# Patient Record
Sex: MALE | Race: WHITE | NOT HISPANIC OR LATINO | Employment: OTHER | URBAN - METROPOLITAN AREA
[De-identification: names, ages, dates, MRNs, and addresses within clinical notes are randomized per-mention and may not be internally consistent; named-entity substitution may affect disease eponyms.]

---

## 2021-05-13 ENCOUNTER — OFFICE VISIT - HEALTHEAST (OUTPATIENT)
Dept: FAMILY MEDICINE | Facility: CLINIC | Age: 46
End: 2021-05-13

## 2021-05-13 DIAGNOSIS — E66.01 MORBID OBESITY (H): ICD-10-CM

## 2021-05-13 DIAGNOSIS — R06.83 SNORING: ICD-10-CM

## 2021-05-13 DIAGNOSIS — R03.0 ELEVATED BLOOD PRESSURE READING WITHOUT DIAGNOSIS OF HYPERTENSION: ICD-10-CM

## 2021-05-13 DIAGNOSIS — Z00.00 ANNUAL PHYSICAL EXAM: ICD-10-CM

## 2021-05-13 DIAGNOSIS — G25.81 RESTLESS LEG SYNDROME: ICD-10-CM

## 2021-05-13 LAB
ALBUMIN SERPL-MCNC: 4 G/DL (ref 3.5–5)
ALP SERPL-CCNC: 80 U/L (ref 45–120)
ALT SERPL W P-5'-P-CCNC: 33 U/L (ref 0–45)
ANION GAP SERPL CALCULATED.3IONS-SCNC: 13 MMOL/L (ref 5–18)
AST SERPL W P-5'-P-CCNC: 19 U/L (ref 0–40)
BILIRUB SERPL-MCNC: 0.3 MG/DL (ref 0–1)
BUN SERPL-MCNC: 13 MG/DL (ref 8–22)
CALCIUM SERPL-MCNC: 9.3 MG/DL (ref 8.5–10.5)
CHLORIDE BLD-SCNC: 105 MMOL/L (ref 98–107)
CHOLEST SERPL-MCNC: 195 MG/DL
CO2 SERPL-SCNC: 24 MMOL/L (ref 22–31)
CREAT SERPL-MCNC: 0.95 MG/DL (ref 0.7–1.3)
ERYTHROCYTE [DISTWIDTH] IN BLOOD BY AUTOMATED COUNT: 14.3 % (ref 11–14.5)
FASTING STATUS PATIENT QL REPORTED: YES
FERRITIN SERPL-MCNC: 58 NG/ML (ref 27–300)
GFR SERPL CREATININE-BSD FRML MDRD: >60 ML/MIN/1.73M2
GLUCOSE BLD-MCNC: 98 MG/DL (ref 70–125)
HBA1C MFR BLD: 5.8 %
HCT VFR BLD AUTO: 47.9 % (ref 40–54)
HDLC SERPL-MCNC: 34 MG/DL
HGB BLD-MCNC: 15.3 G/DL (ref 14–18)
LDLC SERPL CALC-MCNC: 121 MG/DL
MAGNESIUM SERPL-MCNC: 2.2 MG/DL (ref 1.8–2.6)
MCH RBC QN AUTO: 27.7 PG (ref 27–34)
MCHC RBC AUTO-ENTMCNC: 31.9 G/DL (ref 32–36)
MCV RBC AUTO: 87 FL (ref 80–100)
PLATELET # BLD AUTO: 338 THOU/UL (ref 140–440)
PMV BLD AUTO: 10.2 FL (ref 7–10)
POTASSIUM BLD-SCNC: 4.6 MMOL/L (ref 3.5–5)
PROT SERPL-MCNC: 7.4 G/DL (ref 6–8)
RBC # BLD AUTO: 5.52 MILL/UL (ref 4.4–6.2)
SODIUM SERPL-SCNC: 142 MMOL/L (ref 136–145)
TRIGL SERPL-MCNC: 198 MG/DL
TSH SERPL DL<=0.005 MIU/L-ACNC: 2.19 UIU/ML (ref 0.3–5)
WBC: 10.9 THOU/UL (ref 4–11)

## 2021-05-13 ASSESSMENT — PATIENT HEALTH QUESTIONNAIRE - PHQ9: SUM OF ALL RESPONSES TO PHQ QUESTIONS 1-9: 10

## 2021-05-13 ASSESSMENT — MIFFLIN-ST. JEOR: SCORE: 2453.67

## 2021-05-14 LAB — 25(OH)D3 SERPL-MCNC: 20.3 NG/ML (ref 30–80)

## 2021-05-15 ENCOUNTER — COMMUNICATION - HEALTHEAST (OUTPATIENT)
Dept: FAMILY MEDICINE | Facility: CLINIC | Age: 46
End: 2021-05-15

## 2021-05-27 VITALS
HEART RATE: 85 BPM | WEIGHT: 315 LBS | BODY MASS INDEX: 39.17 KG/M2 | TEMPERATURE: 97.6 F | RESPIRATION RATE: 16 BRPM | HEIGHT: 75 IN | SYSTOLIC BLOOD PRESSURE: 133 MMHG | DIASTOLIC BLOOD PRESSURE: 99 MMHG

## 2021-05-27 ASSESSMENT — PATIENT HEALTH QUESTIONNAIRE - PHQ9: SUM OF ALL RESPONSES TO PHQ QUESTIONS 1-9: 10

## 2021-05-30 ENCOUNTER — RECORDS - HEALTHEAST (OUTPATIENT)
Dept: ADMINISTRATIVE | Facility: CLINIC | Age: 46
End: 2021-05-30

## 2021-06-16 PROBLEM — M25.569 PATELLAR PAIN: Status: ACTIVE | Noted: 2021-05-13

## 2021-06-16 PROBLEM — E66.01 MORBID OBESITY (H): Status: ACTIVE | Noted: 2021-05-13

## 2021-06-16 PROBLEM — E55.9 VITAMIN D DEFICIENCY: Status: ACTIVE | Noted: 2021-05-15

## 2021-06-16 PROBLEM — E78.5 DYSLIPIDEMIA: Status: ACTIVE | Noted: 2021-05-15

## 2021-06-16 PROBLEM — R73.01 IMPAIRED FASTING GLUCOSE: Status: ACTIVE | Noted: 2021-05-15

## 2021-06-17 NOTE — PATIENT INSTRUCTIONS - HE
Check home records for previous tetanus booster. Otherwise due September 2021. If you want, I can place an order for nurse only visit.     If blood pressure elevated on recheck, we will see you back for a nurse only visit in a few weeks to recheck (or check at the bariatric clinic). I anticipate this will improve with management of weight and sleep apnea, but goal is under 140/90.     Sleep Clinic: [phone]    Bariatric Clinic: [phone]    If you are signed up for [employer], we will release your labs online with comments. Otherwise, we will plan to send letter in mail. We will call if anything is significantly abnormal.

## 2021-06-17 NOTE — PROGRESS NOTES
Name: Alex Land  : 1975   MRN: [id number]    ASSESSMENT & PLAN:   Alex Land is a 45 y.o. male presenting today for annual physical exam and to establish care.     1. Annual physical exam  Reviewed health history and health maintenance recommendations.  Will be due for tetanus booster in the fall, offered nurse only visit for tetanus booster.  He is going to check with previous clinic as he [place]  Encouraged healthy lifestyle modifications for weight management.  Reviewed PHQ-9 score, is mildly elevated, denies concerns for depression.  Is working on addressing factors (weight, sleep apnea) that are increasing his scores.    Regarding cystic lesion lateral left hip, suspect this is an epidermoid or sebaceous cyst. If enlarging, inflamed, or painful, we can address. As it seems to be shrinking, recommend monitoring for now.    2. Morbid obesity (H)  - Lipid [place] FASTING  - Comprehensive Metabolic Panel  - Vitamin D, Total (25-Hydroxy)  - Ambulatory referral to Sleep Medicine  - Ambulatory referral to Bariatric Care: Surgical and Non-Surgical  - HM2(CBC w/o Differential)  - Thyroid Cascade  - Glycosylated Hemoglobin A1c    Alex successfully lost a significant amount of weight in the past 3 lifestyle modifications.  Confident he can do it again.  Given his BMI greater than 40, do recommend that he meet with the bariatric clinic for guidance, con ability, working with a dietitian, and consideration for medication options.  Reviewed with BMI greater than 40, he technically is also candidate for surgical weight loss.  He will proceed with nonsurgical options for now.    3. Snoring  - Ambulatory referral to Sleep Medicine    Positive stop bang questionnaire.  Risk factors for sleep apnea.  Proceed with sleep medicine referral for sleep study.    4. Elevated blood pressure reading without diagnosis of hypertension  Blood pressure is borderline  elevated today.  No prior readings for comparison.  Recommend checking at the bariatric weight loss clinic.  Suspect he will lose weight with management of sleep apnea and weight loss.  If he is not able to get it checked at the bariatric weight loss clinic in the next few weeks, we can schedule nurse only visit to recheck blood pressure as well.  If blood pressure persistently elevated, will initiate blood pressure medications for management.    5. Restless leg syndrome  - Magnesium  - Ferritin     Rule out electrolyte deficiencies.  Screen for iron deficiency.  We will discuss treatment options pending lab results.    Return for 2 weeks - recheck BP nurse only visit if elevated / 1 year - annual physical exam / as needed.    Tamara Adams, [facility]          Alex Land is a 45 y.o. male presenting to discuss the following:     CC:   Chief Complaint   Patient presents with     [facility]     Annual Exam     Discuss weight loss referral, sleep study referral-concerns of sleep apnea, fasting labs       HPI:  ADHD diagnosed , used stimulants for 2 years [facility], didn't renew after then. Feels he is adequately coping. Initially prescribed by Dr. Chaudhari, Adderall, doesn't he needs right now.     Weight gain: 2010 weighed 280 down to 210. Slow weight gain since then, last 4 years 250 -> 280 --> up to current weight. Previously focused on calorie counting and exercise. Needs to change his diet, stress eating, lacks motivation for physical activity. COVID not helping but previous issue.    Sleep concerns: previous sleep apnea study in  or , was having events but not enough for diagnosis, was 220-230 lb back then. Last 2 years, wakes up in middle of the night, going to the bathroom 3-5 times. Trialed CPAP machine and sleeping through the night.   STOPBAN+ score    PHQ9: 10/27, mood overall is good.     Changing mole: growth or skin tag, a year ago looked  like a pimple, then size of a dime, now starting to shrink, tender to touch.     Changes in vision - prescription is old, 4 years old, noticing in the last year street signs aren't as clear.     Left neck/shoulder pain: can't recreate the pain, randomly occurs every few days to weeks. Feels like crink in neck or when inhaling something feels constricted. Disappears for several days after that.     Does not dyspnea with exertion, gradual onset associated with weight gain.     Restless leg symptoms: always jumpy, actively noticed this year, sitting in recliner and has to move legs every 15 minutes or so. Occasional involuntary twinge/kick.     ROS:   See HPI above. Remaining 10 point ROS negative.     PMH:   Patient Active Problem List   Diagnosis     ADHD, Predominantly Inattentive Type     Hx malalignment of kneecaps bilaterally     Morbid obesity (H)       Past Medical History:   Diagnosis Date     Scarlet fever     diagnosed with heart murmur       PSH:   History reviewed. No pertinent surgical history.      MEDICATIONS:   Current Outpatient Medications on File Prior to Visit   Medication Sig Dispense Refill     ascorbic acid (VITAMIN C ORAL) Take 1 tablet by mouth daily.       aspirin 81 MG EC tablet Take 81 mg by mouth daily.       cholecalciferol, vitamin D3, (VITAMIN D3 ORAL) Take 1 tablet by mouth daily.       ZINC ORAL Take 1 tablet by mouth daily.       No current facility-administered medications on file prior to visit.        ALLERGIES:  No Known Allergies    FAMHx:  Family History   Problem Relation Age of Onset     Hypertension Maternal Grandfather      Asthma Maternal Grandfather      Osteoporosis Maternal Grandfather        SOCIAL HISTORY:   Social History     Tobacco Use     Smoking status: Former Smoker     Years: 1.50     Smokeless tobacco: Never Used   Substance Use Topics     Alcohol use: Yes     Frequency: 2-4 times a month     Drinks per session: 1 or 2     Drug use: Never       PHYSICAL EXAM:  "  BP (!) 145/92 (Patient Site: Left Arm, Patient Position: Sitting, Cuff Size: Adult Large)   Pulse 85   Temp 97.6  F (36.4  C) (Oral)   Resp 16   Ht 6' 2.5\" (1.892 m)   Wt (!) 327 lb 9.6 oz (148.6 kg)   BMI 41.50 kg/m     GENERAL: Alex is a pleasant, morbidly obese male, no acute distress.  HEENT: Sclera white, no cervical lymphadenopathy, no thyromegaly.  HEART: Regular rate and rhythm, no murmurs.  LUNGS: Clear to auscultation bilaterally, unlabored.  ABDOMEN: Abdomen obese, soft, nontender to palpation.  : No concerns today, exam deferred.  MSK: No gross deformities or effusions.  NEURO: No gross deficits present.  Speech intact, normal gait, moving all extremities without difficulties.  PSYCH: Mood is good, normal affect, appropriately groomed, normal thought content.  DERM: Lateral left hip, erythematous nontender, nonwarm cystic lesion.           "

## 2021-06-17 NOTE — PROGRESS NOTES
Patient sent letter in mail with lab results.   A1c mildly elevated, monitor annually for progression to diabetes.  Dyslipidemia present, encourage lifestyle modifications and weight management.   Vitamin D low, recommend OTC supplementation.  Remaining labs in normal range.  Tamara Adams, DO

## 2021-06-21 NOTE — LETTER
Letter by Tamara Adams DO at      Author: Tamara Adams DO Service: -- Author Type: --    Filed:  Encounter Date: 5/15/2021 Status: (Other)         Alex Land  [address] [facility] [address]             May 15, 2021         Dear Mr. Land,    Thank you for coming into the clinic. It was very nice to meet you. Below are the results from your recent visit:    Resulted Orders   Lipid [place] FASTING   Result Value Ref Range    Cholesterol 195 <=199 mg/dL    Triglycerides 198 (H) <=149 mg/dL    HDL Cholesterol 34 (L) >=40 mg/dL    LDL Calculated 121 <=129 mg/dL    Patient Fasting > 8hrs? Yes    Comprehensive Metabolic Panel   Result Value Ref Range    Sodium 142 136 - 145 mmol/L    Potassium 4.6 3.5 - 5.0 mmol/L    Chloride 105 98 - 107 mmol/L    CO2 24 22 - 31 mmol/L    Anion Gap, Calculation 13 5 - 18 mmol/L    Glucose 98 70 - 125 mg/dL    BUN 13 8 - 22 mg/dL    Creatinine 0.95 0.70 - 1.30 mg/dL    GFR MDRD Af Amer >60 >60 mL/min/1.73m2    GFR MDRD Non Af Amer >60 >60 mL/min/1.73m2    Bilirubin, Total 0.3 0.0 - 1.0 mg/dL    Calcium 9.3 8.5 - 10.5 mg/dL    Protein, Total 7.4 6.0 - 8.0 g/dL    Albumin 4.0 3.5 - 5.0 g/dL    Alkaline Phosphatase 80 45 - 120 U/L    AST 19 0 - 40 U/L    ALT 33 0 - 45 U/L    Narrative    Fasting Glucose reference range is 70-99 mg/dL per  American Diabetes Association (ADA) guidelines.   Vitamin D, Total (25-Hydroxy)   Result Value Ref Range    Vitamin D, Total (25-Hydroxy) 20.3 (L) 30.0 - 80.0 ng/mL    Narrative    Deficiency <10.0 ng/mL  Insufficiency 10.0-29.9 ng/mL  Sufficiency 30.0-80.0 ng/mL  Toxicity (possible) >100.0 ng/mL   HM2(CBC w/o Differential)   Result Value Ref Range    WBC 10.9 4.0 - 11.0 thou/uL    RBC 5.52 4.40 - 6.20 mill/uL    Hemoglobin 15.3 14.0 - 18.0 g/dL    Hematocrit 47.9 40.0 - 54.0 %    MCV 87 80 - 100 fL    MCH 27.7 27.0 - 34.0 pg    MCHC 31.9 (L) 32.0 - 36.0 g/dL    RDW 14.3 11.0 - 14.5 %    Platelets 338 140 - 440 thou/uL    MPV 10.2 (H) 7.0  - 10.0 fL   Thyroid Cascade   Result Value Ref Range    TSH 2.19 0.30 - 5.00 uIU/mL   Glycosylated Hemoglobin A1c   Result Value Ref Range    Hemoglobin A1c 5.8 (H) <=5.6 %      Comment:      Prediabetes:   HBA1c       5.7 to 6.4%        Diabetes:        HBA1c        >=6.5%   Patients with Hgb F >5%, total bilirubin >10.0 mg/dL, abnormal red cell turnover, severe renal or hepatic disease or malignancy should not have this A1C method used to diagnose or monitor diabetes.       Magnesium   Result Value Ref Range    Magnesium 2.2 1.8 - 2.6 mg/dL   Ferritin   Result Value Ref Range    Ferritin 58 27 - 300 ng/mL     Overall, your labs look pretty good. Your A1c is just in the pre-diabetes range, which is something we should monitor annually but should improve with weight management. Your vitamin D level is a little low. I recommend supplementation with OTC vitamin D3 1000 international unit(s) daily. Your cholesterol labs are a touch abnormal, but not to a level requiring treatment, and again should respond to weight management and improved nutrition. No electrolyte abnormalities to explain restless leg symptoms. If desired, we can discuss treatment options further to manage those symptoms.    Please call with questions or contact us using [employer].    Sincerely,        Electronically signed by Tamara Adams,

## 2021-07-14 NOTE — PROGRESS NOTES
"Alex Land is a 45 year old male who is being evaluated via a billable video visit.       The patient has been notified of following:      \"This video visit will be conducted via a call between you and your physician/provider. We have found that certain health care needs can be provided without the need for an in-person physical exam.  This service lets us provide the care you need with a video conversation.  If a prescription is necessary we can send it directly to your pharmacy.  If lab work is needed we can place an order for that and you can then stop by our lab to have the test done at a later time.     Video visits are billed at different rates depending on your insurance coverage.  Please reach out to your insurance provider with any questions.     If during the course of the call the physician/provider feels a video visit is not appropriate, you will not be charged for this service.\"     Patient has given verbal consent for Video visit? Yes  How would you like to obtain your AVS? Mail a copy  If you are dropped from the video visit, the video invite should be resent to: Text to cell phone: [phone]  Will anyone else be joining your video visit? No  If patient encounters technical issues they should call [phone]      Video-Visit Details     Type of service:  Video Visit     Video Start Time: 10am  Video End Time: 10:36 AM    Originating Location (pt. Location): Home     Distant Location (provider location):  [facility]      Platform used for Video Visit: AmWell    Virtual visit for high pretest probability for sleep disordered breathing.     Assessment:  -High pretest probability of sleep disordered breathing with a STOP-BANG score of at least 5 with unknown neck circumference  -Mild and intermittent restless leg symptoms  -Low normal ferritin at 58 ng/mL    Plan:  -He appears to be appropriate for home sleep testing given a lack of other significant cardiac or " pulmonary or neurologic comorbidities, orders placed for watch pat home sleep test male device today.  -Given his mild and intermittent restless leg symptoms, he did not appear to be in need of a prescription medication, we did discuss start of oral iron replacement with a goal of at least 65 mg of elemental iron daily.  Specifically we discussed ferrous sulfate 325 mg orally with a form of vitamin C and to avoid calcium within 1 hour before after.    SUBJECTIVE:  Alex Land is a 45 year old year old male.    Pertinent PMHx of morbid obesity, ADHD, elevated BP readings.    Concerns / risk factors for LAUREN with STOPBANG 5+ noted at visit with Dr. Adams on 5/13/2021, also discussion of RLS with plan to check baseline magnesium and ferritin.  BMI 41.5.  CBC, TSH, LFT's WNL.  CMP (HgbA1c 5.8, HDL 34, Triglycerides 198) and vitamin D low at 20.3.  Ferritin low normal at 58.    Today -his concerns are a 3-4-year history of increasing nighttime awakenings and a 10+ year history of daytime fatigue.  He also has been told by friends and family that he can snore very loudly and appear to have prolonged breathing pauses in his sleep.    He recalls having what sounds like an in lab PSG in approximately 2006.  I did not have results to review from this, he does not recall being told that he had sleep apnea and he is unsure how his weight compared then until now.    While he does endorse daytime fatigue, he denies inappropriate daytime sleepiness and no concerns with driving.    No known family history of sleep disordered breathing.    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  He is using this as a sabbatical with a goal to focus on his health.         Past medical history:    Patient Active Problem List    Diagnosis Date Noted     Impaired fasting glucose 05/15/2021     Priority: Medium     Dyslipidemia 05/15/2021     Priority: Medium     Vitamin D  deficiency 05/15/2021     Priority: Medium     Hx malalignment of kneecaps bilaterally 05/13/2021     Priority: Medium     Morbid obesity (H) 05/13/2021     Priority: Medium     ADHD, Predominantly Inattentive Type      Priority: Medium     Created by Conversion  Replacement Utility updated for latest IMO load           10 point ROS of systems including Constitutional, Eyes, Respiratory, Cardiovascular, Gastroenterology, Genitourinary, Integumentary, Muscularskeletal, Psychiatric were all negative except for pertinent positives noted in my HPI.    Current Outpatient Medications   Medication Sig Dispense Refill     ascorbic acid (VITAMIN C ORAL) [ASCORBIC ACID (VITAMIN C ORAL)] Take 1 tablet by mouth daily.       aspirin 81 MG EC tablet [ASPIRIN 81 MG EC TABLET] Take 81 mg by mouth daily.       cholecalciferol, vitamin D3, (VITAMIN D3 ORAL) [CHOLECALCIFEROL, VITAMIN D3, (VITAMIN D3 ORAL)] Take 1 tablet by mouth daily.       ZINC ORAL [ZINC ORAL] Take 1 tablet by mouth daily.         OBJECTIVE:  There were no vitals taken for this visit.    Physical Exam     ---  This note was written with the assistance of the Dragon voice-dictation technology software. The final document, although reviewed, may contain errors. For corrections, please contact the office.    Dakota Zuleta MD    Sleep Medicine  [facility]  ([phone])  [facility]  ([phone])

## 2021-07-15 ENCOUNTER — VIRTUAL VISIT (OUTPATIENT)
Dept: SLEEP MEDICINE | Facility: CLINIC | Age: 46
End: 2021-07-15
Payer: COMMERCIAL

## 2021-07-15 DIAGNOSIS — F98.8 ATTENTION DEFICIT DISORDER, UNSPECIFIED HYPERACTIVITY PRESENCE: Primary | ICD-10-CM

## 2021-07-15 DIAGNOSIS — R06.83 SNORING: ICD-10-CM

## 2021-07-15 DIAGNOSIS — R53.82 CHRONIC FATIGUE: ICD-10-CM

## 2021-07-15 DIAGNOSIS — E66.813 CLASS 3 SEVERE OBESITY DUE TO EXCESS CALORIES WITHOUT SERIOUS COMORBIDITY WITH BODY MASS INDEX (BMI) OF 40.0 TO 44.9 IN ADULT (H): ICD-10-CM

## 2021-07-15 DIAGNOSIS — E66.01 CLASS 3 SEVERE OBESITY DUE TO EXCESS CALORIES WITHOUT SERIOUS COMORBIDITY WITH BODY MASS INDEX (BMI) OF 40.0 TO 44.9 IN ADULT (H): ICD-10-CM

## 2021-07-15 DIAGNOSIS — R06.81 APNEA: ICD-10-CM

## 2021-07-15 PROCEDURE — 99204 OFFICE O/P NEW MOD 45 MIN: CPT | Mod: 95 | Performed by: FAMILY MEDICINE

## 2021-07-15 NOTE — PATIENT INSTRUCTIONS

## 2021-07-16 NOTE — PROGRESS NOTES
AVS has been mailed to patients home address July 16, 2021  Watchpat order forwarded to Tamara canales.     Tatyana HOUSER CMA, SLEEP MEDICINE, 7/16/2021 7:29 AM            Tatyana HOUSER CMA Sleep Medicine

## 2021-08-06 ENCOUNTER — OFFICE VISIT (OUTPATIENT)
Dept: SLEEP MEDICINE | Facility: CLINIC | Age: 46
End: 2021-08-06
Attending: FAMILY MEDICINE
Payer: COMMERCIAL

## 2021-08-06 DIAGNOSIS — R06.83 SNORING: ICD-10-CM

## 2021-08-06 DIAGNOSIS — E66.813 CLASS 3 SEVERE OBESITY DUE TO EXCESS CALORIES WITHOUT SERIOUS COMORBIDITY WITH BODY MASS INDEX (BMI) OF 40.0 TO 44.9 IN ADULT (H): ICD-10-CM

## 2021-08-06 DIAGNOSIS — F98.8 ATTENTION DEFICIT DISORDER, UNSPECIFIED HYPERACTIVITY PRESENCE: ICD-10-CM

## 2021-08-06 DIAGNOSIS — E66.01 CLASS 3 SEVERE OBESITY DUE TO EXCESS CALORIES WITHOUT SERIOUS COMORBIDITY WITH BODY MASS INDEX (BMI) OF 40.0 TO 44.9 IN ADULT (H): ICD-10-CM

## 2021-08-06 DIAGNOSIS — R06.81 APNEA: ICD-10-CM

## 2021-08-06 DIAGNOSIS — R53.82 CHRONIC FATIGUE: ICD-10-CM

## 2021-08-06 PROCEDURE — 95800 SLP STDY UNATTENDED: CPT | Performed by: FAMILY MEDICINE

## 2021-08-06 NOTE — PROGRESS NOTES
Device has been registered and shipped via [employer] on 8/06/21. Patient was notified that package was mailed out. Watch [facility] serial number [id number].

## 2021-08-18 NOTE — PROGRESS NOTES
Watch pat has been scored using rule 1B, 4%.   Patient to follow up with provider to determine appropriate therapy.     Pat AHI: 95.3    Ordering Provider: MD Tamara Issa [facility], [facility]  Sleep Technologist

## 2021-08-18 NOTE — PROCEDURES
"WatchPAT - HOME SLEEP STUDY INTERPRETATION    Patient: Alex Land  MRN: [id number]  YOB: 1975  Study Date: 2021  Referring Provider: No Ref-Primary, Physician  Ordering Provider: Dakota Zuleta MD, MD    Chain of custody patient verification was not enabled.       Indications for Home Study: Alex Land is a 45 year old male with a history of morbid obesity, ADHD, elevated BP readings who presents with symptoms suggestive of obstructive sleep apnea.    Estimated body mass index is 41.5 kg/m  as calculated from the following:    Height as of 21: 1.892 m (6' 2.5\").    Weight as of 21: 148.6 kg (327 lb 9.6 oz).  Total score - [place]: 10 (7/15/2021  9:18 AM)  STOP-BAN+/8    Data: A full night home sleep study was performed recording the standard physiologic parameters including peripheral arterial tonometry (PAT), sound/snoring, body position,  movement, sound, and oxygen saturation by pulse oximetry. Pulse rate was estimated by oximetry recording. Sleep staging (wake, REM, light, and deep sleep) was derived from PAT signal.  This study was considered adequate based on > 4 hours of quality oximetry and respiratory recording. As specified by the AASM Manual for the Scoring of Sleep and Associated events, version 2.3, Rule VIII.D 1B, 4% oxygen desaturation scoring for hypopneas is used as a standard of care on all home sleep apnea testing.    Total Recording Time: 9 hrs, 2 min  Total Sleep Time: 8 hrs, 29 min  % of Sleep Time REM: 16.6%    Respiratory:  Snoring: Snoring was present.  Respiratory events: The PAT respiratory disturbance index [pRDI] was 9539 events per hour.  The PAT apnea/hypopnea index [pAHI] was 95.3 events per hour.  MARIE was 90.9 events per hour.  During REM sleep the pAHI was 76.1.  Sleep Associated Hypoxemia: sustained hypoxemia was not present, SpO2 did appear to normalize between very frequent events. Mean oxygen saturation was 88%.  Minimum was " 60%.  Time with saturation less than 88% was 212.8 minutes.    Heart Rate: By pulse oximetry normal rate was noted.     Position: Percent of time spent: supine - 71.5%, prone - 24.1%, on right - 0%, on left - 4%.  pAHI was 99.2 per hour supine, 88.6 per hour prone, - per hour on right side, and 66.4 per hour on left side.     Assessment:   Severe obstructive sleep apnea.  Sleep associated hypoxemia was present.    Recommendations:  Consider auto-CPAP at 5-15 cmH2O or polysomnography with full night PAP titration.  Suggest optimizing sleep hygiene and avoiding sleep deprivation.  Weight management.    Diagnosis Code(s): Obstructive Sleep Apnea G47.33, Hypoxemia G47.36    Dakota Zuleta MD, MD, August 18, 2021   Diplomate, American Board of Family Medicine, Sleep Medicine

## 2021-08-19 ASSESSMENT — SLEEP AND FATIGUE QUESTIONNAIRES
HOW LIKELY ARE YOU TO NOD OFF OR FALL ASLEEP WHEN YOU ARE A PASSENGER IN A CAR FOR AN HOUR WITHOUT A BREAK: HIGH CHANCE OF DOZING
HOW LIKELY ARE YOU TO NOD OFF OR FALL ASLEEP WHILE SITTING AND TALKING TO SOMEONE: WOULD NEVER DOZE
HOW LIKELY ARE YOU TO NOD OFF OR FALL ASLEEP WHILE SITTING QUIETLY AFTER LUNCH WITHOUT ALCOHOL: MODERATE CHANCE OF DOZING
HOW LIKELY ARE YOU TO NOD OFF OR FALL ASLEEP WHILE SITTING AND READING: HIGH CHANCE OF DOZING
HOW LIKELY ARE YOU TO NOD OFF OR FALL ASLEEP WHILE LYING DOWN TO REST IN THE AFTERNOON WHEN CIRCUMSTANCES PERMIT: HIGH CHANCE OF DOZING
HOW LIKELY ARE YOU TO NOD OFF OR FALL ASLEEP WHILE WATCHING TV: MODERATE CHANCE OF DOZING
HOW LIKELY ARE YOU TO NOD OFF OR FALL ASLEEP IN A CAR, WHILE STOPPED FOR A FEW MINUTES IN TRAFFIC: WOULD NEVER DOZE
HOW LIKELY ARE YOU TO NOD OFF OR FALL ASLEEP WHILE SITTING INACTIVE IN A PUBLIC PLACE: WOULD NEVER DOZE

## 2021-08-19 NOTE — PROGRESS NOTES
"Angel is a 45 year old who is being evaluated via a billable video visit.      How would you like to obtain your AVS? MyChart  If the video visit is dropped, the invitation should be resent by: Text to cell phone: [phone]  Will anyone else be joining your video visit? No  {If patient encounters technical issues they should call [phone] :[id number]}    Nieves Kamara CMA    Video Start Time: {video visit start/end time for provider to select:152948}  Video-Visit Details    Type of service:  Video Visit    Video End Time:{video visit start/end time for provider to select:[id number]}    Originating Location (pt. Location): {video visit patient location:241899::\"Home\"}    Distant Location (provider location):  [facility]     Platform used for Video Visit: {Virtual Visit Platforms:656193::\"[employer]\"}  "

## 2021-08-20 ENCOUNTER — VIRTUAL VISIT (OUTPATIENT)
Dept: SLEEP MEDICINE | Facility: CLINIC | Age: 46
End: 2021-08-20
Payer: COMMERCIAL

## 2021-08-20 DIAGNOSIS — E66.813 CLASS 3 SEVERE OBESITY DUE TO EXCESS CALORIES WITHOUT SERIOUS COMORBIDITY WITH BODY MASS INDEX (BMI) OF 40.0 TO 44.9 IN ADULT (H): ICD-10-CM

## 2021-08-20 DIAGNOSIS — E66.01 CLASS 3 SEVERE OBESITY DUE TO EXCESS CALORIES WITHOUT SERIOUS COMORBIDITY WITH BODY MASS INDEX (BMI) OF 40.0 TO 44.9 IN ADULT (H): ICD-10-CM

## 2021-08-20 DIAGNOSIS — R53.82 CHRONIC FATIGUE: ICD-10-CM

## 2021-08-20 DIAGNOSIS — G47.33 OSA (OBSTRUCTIVE SLEEP APNEA): ICD-10-CM

## 2021-08-20 DIAGNOSIS — F98.8 ATTENTION DEFICIT DISORDER, UNSPECIFIED HYPERACTIVITY PRESENCE: Primary | ICD-10-CM

## 2021-08-20 PROCEDURE — 99214 OFFICE O/P EST MOD 30 MIN: CPT | Mod: 95 | Performed by: FAMILY MEDICINE

## 2021-08-20 NOTE — PROGRESS NOTES
"Alex Land is a 45 year old male who is being evaluated via a billable video visit.       The patient has been notified of following:      \"This video visit will be conducted via a call between you and your physician/provider. We have found that certain health care needs can be provided without the need for an in-person physical exam.  This service lets us provide the care you need with a video conversation.  If a prescription is necessary we can send it directly to your pharmacy.  If lab work is needed we can place an order for that and you can then stop by our lab to have the test done at a later time.     Video visits are billed at different rates depending on your insurance coverage.  Please reach out to your insurance provider with any questions.     If during the course of the call the physician/provider feels a video visit is not appropriate, you will not be charged for this service.\"     Patient has given verbal consent for Video visit? Yes  How would you like to obtain your AVS? Mail a copy  If you are dropped from the video visit, the video invite should be resent to: Text to cell phone: -  Will anyone else be joining your video visit? No  If patient encounters technical issues they should call [phone]      Video-Visit Details     Type of service:  Video Visit     Video Start Time: 11:30am  Video End Time: 11:50am    Originating Location (pt. Location): Home     Distant Location (provider location):  [facility] SLE[facility]      Platform used for Video Visit: [employer]    Virtual visit for review of home sleep testing results.     Assessment:  - Severe LAUREN with sleep-associated hypoxemia    Plan:  - Recommend start of treatment with CPAP auto-titrate 5-15 cm H2O and weight management, consider PAP titration PSG.  - Entered orders for CPAP auto-titrate 5-15 cm H2O.    SUBJECTIVE:  Alex Land is a 45 year old year old male.    Pertinent PMHx of morbid obesity, ADHD, elevated " "BP readings.     Concerns / risk factors for LAUREN with STOPBANG 5+ noted at visit with Dr. Adams on 5/13/2021, also discussion of RLS with plan to check baseline magnesium and ferritin.  BMI 41.5.  CBC, TSH, LFT's WNL.  CMP (HgbA1c 5.8, HDL 34, Triglycerides 198) and vitamin D low at 20.3.  Ferritin low normal at 58.     7/15/2021 -his concerns are a 3-4-year history of increasing nighttime awakenings and a 10+ year history of daytime fatigue.  He also has been told by friends and family that he can snore very loudly and appear to have prolonged breathing pauses in his sleep.     He recalls having what sounds like an in lab PSG in approximately 2006.  I did not have results to review from this, he does not recall being told that he had sleep apnea and he is unsure how his weight compared then until now.     While he does endorse daytime fatigue, he denies inappropriate daytime sleepiness and no concerns with driving.     No known family history of sleep disordered breathing.     Social history:  He currently resigned from his job working for [employer] for [employer] for the last 4 years.  He is using this as a sabbatical with a goal to focus on his health.    A/P for watchPAT HST.    Today - We reviewed his home sleep test results in detail.    JEANETTE Total Score: 13     WatchPAT - HOME SLEEP STUDY INTERPRETATION     Patient: Alex Land  MRN: [id number]  YOB: 1975  Study Date: 8/18/2021  Referring Provider: No Ref-Primary, Physician  Ordering Provider: Dakota Zuleta MD, MD     Chain of custody patient verification was not enabled.       Indications for Home Study: Alex Land is a 45 year old male with a history of morbid obesity, ADHD, elevated BP readings who presents with symptoms suggestive of obstructive sleep apnea.     Estimated body mass index is 41.5 kg/m  as calculated from the following:    Height as of 5/13/21: 1.892 m (6' 2.5\").    Weight as of 5/13/21: 148.6 kg (327 lb 9.6 " oz).  Total score - [place]: 10 (7/15/2021  9:18 AM)  STOP-BAN+/8     Data: A full night home sleep study was performed recording the standard physiologic parameters including peripheral arterial tonometry (PAT), sound/snoring, body position,  movement, sound, and oxygen saturation by pulse oximetry. Pulse rate was estimated by oximetry recording. Sleep staging (wake, REM, light, and deep sleep) was derived from PAT signal.  This study was considered adequate based on > 4 hours of quality oximetry and respiratory recording. As specified by the AASM Manual for the Scoring of Sleep and Associated events, version 2.3, Rule VIII.D 1B, 4% oxygen desaturation scoring for hypopneas is used as a standard of care on all home sleep apnea testing.     Total Recording Time: 9 hrs, 2 min  Total Sleep Time: 8 hrs, 29 min  % of Sleep Time REM: 16.6%     Respiratory:  Snoring: Snoring was present.  Respiratory events: The PAT respiratory disturbance index [pRDI] was 9539 events per hour.  The PAT apnea/hypopnea index [pAHI] was 95.3 events per hour.  MARIE was 90.9 events per hour.  During REM sleep the pAHI was 76.1.  Sleep Associated Hypoxemia: sustained hypoxemia was not present, SpO2 did appear to normalize between very frequent events. Mean oxygen saturation was 88%.  Minimum was 60%.  Time with saturation less than 88% was 212.8 minutes.     Heart Rate: By pulse oximetry normal rate was noted.      Position: Percent of time spent: supine - 71.5%, prone - 24.1%, on right - 0%, on left - 4%.  pAHI was 99.2 per hour supine, 88.6 per hour prone, - per hour on right side, and 66.4 per hour on left side.      Assessment:   Severe obstructive sleep apnea.  Sleep associated hypoxemia was present.     Recommendations:  Consider auto-CPAP at 5-15 cmH2O or polysomnography with full night PAP titration.  Suggest optimizing sleep hygiene and avoiding sleep deprivation.  Weight management.     Diagnosis Code(s): Obstructive Sleep Apnea  G47.33, Hypoxemia G47.36     Dakota Zuleta MD, MD, August 18, 2021   Diplomate, American Board of Family Medicine, Sleep Medicine    Past medical history:    Patient Active Problem List    Diagnosis Date Noted     Impaired fasting glucose 05/15/2021     Priority: Medium     Dyslipidemia 05/15/2021     Priority: Medium     Vitamin D deficiency 05/15/2021     Priority: Medium     Hx malalignment of kneecaps bilaterally 05/13/2021     Priority: Medium     Class 3 severe obesity due to excess calories without serious comorbidity with body mass index (BMI) of 40.0 to 44.9 in adult (H) 05/13/2021     Priority: Medium     ADHD, Predominantly Inattentive Type      Priority: Medium     Created by Conversion  Replacement Utility updated for latest IMO load           10 point ROS of systems including Constitutional, Eyes, Respiratory, Cardiovascular, Gastroenterology, Genitourinary, Integumentary, Muscularskeletal, Psychiatric were all negative except for pertinent positives noted in my HPI.    Current Outpatient Medications   Medication Sig Dispense Refill     ascorbic acid (VITAMIN C ORAL) [ASCORBIC ACID (VITAMIN C ORAL)] Take 1 tablet by mouth daily.       cholecalciferol, vitamin D3, (VITAMIN D3 ORAL) [CHOLECALCIFEROL, VITAMIN D3, (VITAMIN D3 ORAL)] Take 1 tablet by mouth daily.       ZINC ORAL [ZINC ORAL] Take 1 tablet by mouth daily.         OBJECTIVE:  There were no vitals taken for this visit.    Physical Exam     ---  This note was written with the assistance of the Dragon voice-dictation technology software. The final document, although reviewed, may contain errors. For corrections, please contact the office.    Dakota Zuleta MD    Sleep Medicine  [facility]  ([phone])  [facility]  ([phone])

## 2021-10-17 ENCOUNTER — HEALTH MAINTENANCE LETTER (OUTPATIENT)
Age: 46
End: 2021-10-17

## 2021-11-30 ENCOUNTER — DOCUMENTATION ONLY (OUTPATIENT)
Dept: SLEEP MEDICINE | Facility: CLINIC | Age: 46
End: 2021-11-30
Payer: COMMERCIAL

## 2021-12-06 ENCOUNTER — DOCUMENTATION ONLY (OUTPATIENT)
Dept: SLEEP MEDICINE | Facility: CLINIC | Age: 46
End: 2021-12-06
Payer: COMMERCIAL

## 2021-12-06 NOTE — PROGRESS NOTES
Patient was offered choice of vendor and chose [facility].  Patient Alex Land was set up at [place] on December 6, 2021. Patient received a Resmed Airsense 10 Pressures were set at 5-15 cm H2O.   Patient s ramp is 5 cm H2O for Auto and FLEX/EPR is 2.  Patient received a Resmed Mask name: AirFit [id number]  Nasal mask size Wide, heated tubing and heated humidifier.  Patient does need to meet compliance. Patient has a follow up on 2/10/22 @ 11:30am with Dr. Zuleta.    Tatyana Hood

## 2021-12-09 ENCOUNTER — DOCUMENTATION ONLY (OUTPATIENT)
Dept: SLEEP MEDICINE | Facility: CLINIC | Age: 46
End: 2021-12-09
Payer: COMMERCIAL

## 2021-12-09 NOTE — PROGRESS NOTES
3 day Sleep therapy management telephone visit    Diagnostic AHI: 95.3    watch PAT    Confirmed with patient at time of call- N/A Patient is still interested in STM service       Message left for patient to return call        Objective data     Order Settings for PAP  CPAP min 5    CPAP max 15        Device settings from machine CPAP min 5.0     CPAP max 15.0      EPR Setting TWO    RESMED soft response  OFF     Assessment: Nightly usage over four hours      Action plan: Patient to have 14 day STM visit. Patient has a follow up visit scheduled:   yes within 31-90 days of set up    Replacement device: No  STM ordered by provider: Yes     Total time spent on accessing and  interpreting remote patient PAP therapy data  10 minutes    Total time spent counseling, coaching  and reviewing PAP therapy data with patient  0 minutes    16007 no

## 2021-12-21 ENCOUNTER — DOCUMENTATION ONLY (OUTPATIENT)
Dept: SLEEP MEDICINE | Facility: CLINIC | Age: 46
End: 2021-12-21
Payer: COMMERCIAL

## 2021-12-21 NOTE — PROGRESS NOTES
14  DAY STM VISIT    Diagnostic AHI: 95.3  PSG    Message left for patient to return call.     Assessment: Pt meeting objective benchmarks.       Action plan: pt to have 30 day STM visit.      Device type: Auto-CPAP    PAP settings: CPAP min 5.0 cm  H20       CPAP max 15.0 cm  H20      95th% pressure 13.7 cm  H20        RESMED EPR level Setting: TWO    RESMED Soft response setting:  OFF    Mask type:  Nasal Mask    Objective measures: 14 day rolling measures      Compliance  100 %      Leak  15.36  lpm  last  upload      AHI 2.28   last  upload      Average number of minutes 332      Objective measure goal  Compliance   Goal >70%  Leak   Goal < 24 lpm  AHI  Goal < 5  Usage  Goal >240        Total time spent on accessing and interpreting remote patient PAP therapy data  10 minutes    Total time spent counseling, coaching  and reviewing PAP therapy data with patient  1 minutes    [id number]  [id number]  no (3 day STM)

## 2022-01-06 ENCOUNTER — DOCUMENTATION ONLY (OUTPATIENT)
Dept: SLEEP MEDICINE | Facility: CLINIC | Age: 47
End: 2022-01-06
Payer: COMMERCIAL

## 2022-01-06 NOTE — PROGRESS NOTES
30 DAY STM VISIT    Diagnostic AHI: 95.3  PSG    Subjective measures: Patient said things are great. He thinks it's a night and day difference.     Assessment: Pt meeting objective benchmarks.  Patient meeting subjective benchmarks.   Action plan: pt to have 6 month [facility] visit  Patient has scheduled a follow up visit with Dr. Zuleta on 2/10/22.   Device type: Auto-CPAP  PAP settings: CPAP min 5.0 cm  H20     CPAP max 15.0 cm  H20    95th% pressure 14.4 cm  H20      RESMED EPR level Setting: TWO    RESMED Soft response setting:  OFF  Mask type:  Nasal Mask  Objective measures: 14 day rolling measures      Compliance  85 %      Leak  16.56 lpm  last  upload      AHI 2.31   last  upload      Average number of minutes 287      Objective measure goal  Compliance   Goal >70%  Leak   Goal < 24 lpm  AHI  Goal < 5  Usage  Goal >240        Total time spent on accessing and interpreting remote patient PAP therapy data  2 minutes    Total time spent counseling, coaching  and reviewing PAP therapy data with patient  5 minutes     [id number] this call  [id number] no  at 3 or 14 day [facility]

## 2022-02-09 NOTE — PROGRESS NOTES
"Alex Land is a 46 year old male who is being evaluated via a billable video visit.       The patient has been notified of following:      \"This video visit will be conducted via a call between you and your physician/provider. We have found that certain health care needs can be provided without the need for an in-person physical exam.  This service lets us provide the care you need with a video conversation.  If a prescription is necessary we can send it directly to your pharmacy.  If lab work is needed we can place an order for that and you can then stop by our lab to have the test done at a later time.     Video visits are billed at different rates depending on your insurance coverage.  Please reach out to your insurance provider with any questions.     If during the course of the call the physician/provider feels a video visit is not appropriate, you will not be charged for this service.\"     Patient has given verbal consent for Video visit? Yes  How would you like to obtain your AVS? Mail a copy  If you are dropped from the video visit, the video invite should be resent to: Text to cell phone: -  Will anyone else be joining your video visit? No  If patient encounters technical issues they should call [phone]      Video-Visit Details     Type of service:  Video Visit     Video Start Time: 11:30am  Video End Time: 11:50am    Originating Location (pt. Location): Home     Distant Location (provider location):  [facility] SLE[facility]      Platform used for Video Visit: [employer]    Virtual visit for severe LAUREN managed with CPAP.     Assessment:  - Severe LAUREN with sleep-associated hypoxemia     Plan:  - Appears well controlled with CPAP auto-titrate 5-15 cm H2O.  - Compliance appears adequate, but discussed goal usage of 7 or more hours on a nightly basis.  - Follow- up in 1 year    SUBJECTIVE:  Alex Land is a 46 year old male.    Prior sleep testin2021 - HST with weight 327 " lbs, BMI 41.5.  pAHI 95.3.  Mean SpO2 88%, wally SpO2 60%, Time with saturation less than 88% was 212.8 minutes.  SpO2 did appear to normalize between very frequent events.      7/15/2021 -his concerns are a 3-4-year history of increasing nighttime awakenings and a 10+ year history of daytime fatigue.  He also has been told by friends and family that he can snore very loudly and appear to have prolonged breathing pauses in his sleep.     He recalls having what sounds like an in lab PSG in approximately 2006.  I did not have results to review from this, he does not recall being told that he had sleep apnea and he is unsure how his weight compared then until now.     While he does endorse daytime fatigue, he denies inappropriate daytime sleepiness and no concerns with driving.     No known family history of sleep disordered breathing.     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  He is using this as a sabbatical with a goal to focus on his health.     A/P for watchPAT HST.     8/20/2021 - We reviewed his home sleep test results in detail.  A/P to start treatment with CPAP 5-15.    Today - Overall, he feels he is doing very well with his CPAP.  Feels well rested, resolution of daytime fatigue.  No specific concerns.    CPAP download reviewed over past 30 days on auto-titrate 5-15 cm H2O.  Average daily usage 247 minutes.  AHI 2.8.      Insomnia Severity Index    Difficulty falling asleep 1    Difficulty staying asleep 2    Problems waking up too early 1    How SATISFIED/DISSATISFIED are you with your CURRENT sleep pattern? 2    How NOTICEABLE to others do you think your sleep problem is in terms of impairing the quality of your life? 3    How WORRIED/DISTRESSED are you about your current sleep problem? 2    To what extent do you consider your sleep problem to INTERFERE with your daily functioning (e.g. daytime fatigue, mood, ability to function at  work/daily chores, concentration, memory, mood, etc.) CURRENTLY? 2    Total Score 13        Past medical history:    Patient Active Problem List    Diagnosis Date Noted     LAUREN (obstructive sleep apnea) 08/20/2021     Priority: Medium     Impaired fasting glucose 05/15/2021     Priority: Medium     Dyslipidemia 05/15/2021     Priority: Medium     Vitamin D deficiency 05/15/2021     Priority: Medium     Hx malalignment of kneecaps bilaterally 05/13/2021     Priority: Medium     Class 3 severe obesity due to excess calories without serious comorbidity with body mass index (BMI) of 40.0 to 44.9 in adult (H) 05/13/2021     Priority: Medium     ADHD, Predominantly Inattentive Type      Priority: Medium     Created by Conversion  Replacement Utility updated for latest IMO load           10 point ROS of systems including Constitutional, Eyes, Respiratory, Cardiovascular, Gastroenterology, Genitourinary, Integumentary, Muscularskeletal, Psychiatric were all negative except for pertinent positives noted in my HPI.    Current Outpatient Medications   Medication Sig Dispense Refill     ascorbic acid (VITAMIN C ORAL) [ASCORBIC ACID (VITAMIN C ORAL)] Take 1 tablet by mouth daily.       cholecalciferol, vitamin D3, (VITAMIN D3 ORAL) [CHOLECALCIFEROL, VITAMIN D3, (VITAMIN D3 ORAL)] Take 1 tablet by mouth daily.       ZINC ORAL [ZINC ORAL] Take 1 tablet by mouth daily.         OBJECTIVE:  There were no vitals taken for this visit.    Physical Exam     ---  This note was written with the assistance of the Dragon voice-dictation technology software. The final document, although reviewed, may contain errors. For corrections, please contact the office.    Dakota Zuleta MD    Sleep Medicine  [facility]  ([phone])  [facility]  ([phone])    Time spent on the date of service:  25 minutes.

## 2022-02-10 ENCOUNTER — VIRTUAL VISIT (OUTPATIENT)
Dept: SLEEP MEDICINE | Facility: CLINIC | Age: 47
End: 2022-02-10
Payer: COMMERCIAL

## 2022-02-10 VITALS — HEIGHT: 75 IN | WEIGHT: 315 LBS | BODY MASS INDEX: 39.17 KG/M2

## 2022-02-10 DIAGNOSIS — G47.33 OSA (OBSTRUCTIVE SLEEP APNEA): Primary | ICD-10-CM

## 2022-02-10 DIAGNOSIS — F98.8 ATTENTION DEFICIT DISORDER, UNSPECIFIED HYPERACTIVITY PRESENCE: ICD-10-CM

## 2022-02-10 DIAGNOSIS — E66.813 CLASS 3 SEVERE OBESITY DUE TO EXCESS CALORIES WITHOUT SERIOUS COMORBIDITY WITH BODY MASS INDEX (BMI) OF 40.0 TO 44.9 IN ADULT (H): ICD-10-CM

## 2022-02-10 DIAGNOSIS — E66.01 CLASS 3 SEVERE OBESITY DUE TO EXCESS CALORIES WITHOUT SERIOUS COMORBIDITY WITH BODY MASS INDEX (BMI) OF 40.0 TO 44.9 IN ADULT (H): ICD-10-CM

## 2022-02-10 PROCEDURE — 99213 OFFICE O/P EST LOW 20 MIN: CPT | Mod: 95 | Performed by: FAMILY MEDICINE

## 2022-02-10 ASSESSMENT — SLEEP AND FATIGUE QUESTIONNAIRES
HOW LIKELY ARE YOU TO NOD OFF OR FALL ASLEEP WHILE SITTING AND READING: MODERATE CHANCE OF DOZING
HOW LIKELY ARE YOU TO NOD OFF OR FALL ASLEEP WHILE SITTING QUIETLY AFTER LUNCH WITHOUT ALCOHOL: SLIGHT CHANCE OF DOZING
HOW LIKELY ARE YOU TO NOD OFF OR FALL ASLEEP WHILE LYING DOWN TO REST IN THE AFTERNOON WHEN CIRCUMSTANCES PERMIT: HIGH CHANCE OF DOZING
HOW LIKELY ARE YOU TO NOD OFF OR FALL ASLEEP WHILE SITTING AND TALKING TO SOMEONE: WOULD NEVER DOZE
HOW LIKELY ARE YOU TO NOD OFF OR FALL ASLEEP WHILE SITTING INACTIVE IN A PUBLIC PLACE: WOULD NEVER DOZE
HOW LIKELY ARE YOU TO NOD OFF OR FALL ASLEEP WHEN YOU ARE A PASSENGER IN A CAR FOR AN HOUR WITHOUT A BREAK: MODERATE CHANCE OF DOZING
HOW LIKELY ARE YOU TO NOD OFF OR FALL ASLEEP IN A CAR, WHILE STOPPED FOR A FEW MINUTES IN TRAFFIC: WOULD NEVER DOZE
HOW LIKELY ARE YOU TO NOD OFF OR FALL ASLEEP WHILE WATCHING TV: MODERATE CHANCE OF DOZING

## 2022-02-10 ASSESSMENT — MIFFLIN-ST. JEOR: SCORE: 2459.56

## 2022-02-10 ASSESSMENT — PAIN SCALES - GENERAL: PAINLEVEL: NO PAIN (0)

## 2022-02-10 NOTE — PROGRESS NOTES
"Angel is a 46 year old who is being evaluated via a billable video visit.      How would you like to obtain your AVS? MyChart  If the video visit is dropped, the invitation should be resent by: Text to cell phone: [phone]   Will anyone else be joining your video visit? No  {If patient encounters technical issues they should call [phone] :[id number]}    Video Start Time: {video visit start/end time for provider to select:152948}  Video-Visit Details    Type of service:  Video Visit    Video End Time:{video visit start/end time for provider to select:152948}    Originating Location (pt. Location): {video visit patient location:724973::\"Home\"}    Distant Location (provider location):  [facility]     Platform used for Video Visit: {Virtual Visit Platforms:943140::\"[employer]\"}  "

## 2022-02-10 NOTE — PATIENT INSTRUCTIONS
Your Body mass index is 41.8 kg/m .  Weight management is a personal decision.  If you are interested in exploring weight loss strategies, the following discussion covers the approaches that may be successful. Body mass index (BMI) is one way to tell whether you are at a healthy weight, overweight, or obese. It measures your weight in relation to your height.  A BMI of 18.5 to 24.9 is in the healthy range. A person with a BMI of 25 to 29.9 is considered overweight, and someone with a BMI of 30 or greater is considered obese. More than two-thirds of American adults are considered overweight or obese.  Being overweight or obese increases the risk for further weight gain. Excess weight may lead to heart disease and diabetes.  Creating and following plans for healthy eating and physical activity may help you improve your health.  Weight control is part of healthy lifestyle and includes exercise, emotional health, and healthy eating habits. Careful eating habits lifelong are the mainstay of weight control. Though there are significant health benefits from weight loss, long-term weight loss with diet alone may be very difficult to achieve- studies show long-term success with dietary management in less than 10% of people. Attaining a healthy weight may be especially difficult to achieve in those with severe obesity. In some cases, medications, devices and surgical management might be considered.  What can you do?  If you are overweight or obese and are interested in methods for weight loss, you should discuss this with your provider.     Consider reducing daily calorie intake by 500 calories.     Keep a food journal.     Avoiding skipping meals, consider cutting portions instead.    Diet combined with exercise helps maintain muscle while optimizing fat loss. Strength training is particularly important for building and maintaining muscle mass. Exercise helps reduce stress, increase energy, and improves fitness. Increasing  exercise without diet control, however, may not burn enough calories to loose weight.       Start walking three days a week 10-20 minutes at a time    Work towards walking thirty minutes five days a week     Eventually, increase the speed of your walking for 1-2 minutes at time    In addition, we recommend that you review healthy lifestyles and methods for weight loss available through the National Institutes of Health patient information sites:  http://win.niddk.nih.gov/publications/index.htm    And look into health and wellness programs that may be available through your health insurance provider, employer, local community center, or marta club.

## 2022-02-14 NOTE — NURSING NOTE
Return in about 1 year reminder created and to be send via [employer].       Stephanie Bob FELIX  [facility]

## 2022-03-08 ENCOUNTER — NURSE TRIAGE (OUTPATIENT)
Dept: NURSING | Facility: CLINIC | Age: 47
End: 2022-03-08
Payer: COMMERCIAL

## 2022-03-09 NOTE — TELEPHONE ENCOUNTER
Pt called in states he tested on 2/14/22.  Pt was quarantine for 10 days.  Pt has cough.  The cough started 2 days ago.  No fever.  No difficulty breathing.  No runny nose.  Sore throat.  No loss of smell or test.  No history of asthma or heart disease.  No wheezing.  Pulse O2 97%  No fatigue no muscle pain or weakness.  The disposition is home care.  Care advice given per protocol.  Patient agrees with care advice given.   Agreed to call back if he has additional symptoms or questions.      Lc Rahman [place] Nurse Advisor 3/8/2022 6:19 PM        Reason for Disposition    [1] PERSISTING SYMPTOMS OF COVID-19 AND [2] NEW symptom AND [3] that sounds mild    Additional Information    Negative: SEVERE difficulty breathing (e.g., struggling for each breath, speaks in single words)    Negative: [1] SEVERE weakness (e.g., can't stand or can barely walk) AND [2] new-onset or WORSE    Negative: Difficult to awaken or acting confused (e.g., disoriented, slurred speech)    Negative: Bluish (or gray) lips or face now    Negative: Sounds like a life-threatening emergency to the triager    Negative: [1] Typical COVID-19 symptoms AND [2] lasting less than 3 weeks    Negative: [1] Chest pain, pressure, or tightness AND [2] new-onset or worsening    Negative: [1] Fever AND [2] new-onset or worsening    Negative: [1] MODERATE difficulty breathing (e.g., speaks in phrases, SOB even at rest, pulse 100-120) AND [2] new-onset or WORSE    Negative: [1] MODERATE difficulty breathing AND [2] oxygen level (e.g., pulse oximetry) 91 to 94 percent    Negative: Oxygen level (e.g., pulse oximetry) 90 percent or lower    Negative: MODERATE difficulty breathing (e.g., speaks in phrases, SOB even at rest, pulse 100-120)    Negative: [1] Drinking very little AND [2] dehydration suspected (e.g., no urine > 12 hours, very dry mouth, very lightheaded)    Negative: Patient sounds very sick or weak to the triager    Negative: [1] MILD difficulty  breathing (e.g., minimal/no SOB at rest, SOB with walking, pulse <100) AND [2] new-onset    Negative: Oxygen level (e.g., pulse oximetry) 91 to 94 percent    Negative: [1] PERSISTING SYMPTOMS OF COVID-19 AND [2] NEW symptom AND [3] could be serious    Negative: [1] Caller has URGENT question AND [2] triager unable to answer question    Negative: [1] PERSISTING SYMPTOMS OF COVID-19 AND [2] symptoms WORSE    Negative: [1] Caller has NON-URGENT question AND [2] triager unable to answer    Negative: [1] PERSISTING SYMPTOMS OF COVID-19 AND [2] symptoms BETTER (improving)    Negative: [1] PERSISTING SYMPTOMS OF COVID-19 AND [2] symptoms SAME AND [3] medical visit for COVID-19 in past 2 weeks    Negative: [1] PERSISTING SYMPTOMS OF COVID-19 AND [2] NO medical visit for COVID-19 in past 2 weeks    Protocols used: CORONAVIRUS (COVID-19) PERSISTING SYMPTOMS FOLLOW-UP CALL-A- 8.25.2021

## 2022-06-20 ASSESSMENT — ENCOUNTER SYMPTOMS
WEAKNESS: 0
HEARTBURN: 0
HEADACHES: 0
JOINT SWELLING: 0
CHILLS: 0
HEMATOCHEZIA: 0
NERVOUS/ANXIOUS: 0
FEVER: 0
EYE PAIN: 0
PARESTHESIAS: 0
FREQUENCY: 0
NAUSEA: 0
DIZZINESS: 0
DIARRHEA: 0
ABDOMINAL PAIN: 0
HEMATURIA: 0
DYSURIA: 0
SHORTNESS OF BREATH: 0
ARTHRALGIAS: 0
COUGH: 0
PALPITATIONS: 0
MYALGIAS: 0
SORE THROAT: 0
CONSTIPATION: 0

## 2022-06-20 ASSESSMENT — PATIENT HEALTH QUESTIONNAIRE - PHQ9
SUM OF ALL RESPONSES TO PHQ QUESTIONS 1-9: 3
SUM OF ALL RESPONSES TO PHQ QUESTIONS 1-9: 3
10. IF YOU CHECKED OFF ANY PROBLEMS, HOW DIFFICULT HAVE THESE PROBLEMS MADE IT FOR YOU TO DO YOUR WORK, TAKE CARE OF THINGS AT HOME, OR GET ALONG WITH OTHER PEOPLE: NOT DIFFICULT AT ALL

## 2022-06-21 ENCOUNTER — OFFICE VISIT (OUTPATIENT)
Dept: FAMILY MEDICINE | Facility: CLINIC | Age: 47
End: 2022-06-21
Payer: COMMERCIAL

## 2022-06-21 VITALS
SYSTOLIC BLOOD PRESSURE: 147 MMHG | DIASTOLIC BLOOD PRESSURE: 97 MMHG | HEIGHT: 75 IN | BODY MASS INDEX: 39.17 KG/M2 | HEART RATE: 78 BPM | WEIGHT: 315 LBS

## 2022-06-21 DIAGNOSIS — R73.01 IMPAIRED FASTING GLUCOSE: ICD-10-CM

## 2022-06-21 DIAGNOSIS — L91.8 SKIN TAG: ICD-10-CM

## 2022-06-21 DIAGNOSIS — R35.0 URINARY FREQUENCY: ICD-10-CM

## 2022-06-21 DIAGNOSIS — D22.9 HALO NEVUS: ICD-10-CM

## 2022-06-21 DIAGNOSIS — Z00.00 ANNUAL PHYSICAL EXAM: Primary | ICD-10-CM

## 2022-06-21 DIAGNOSIS — Z12.11 SCREEN FOR COLON CANCER: ICD-10-CM

## 2022-06-21 DIAGNOSIS — I10 BENIGN ESSENTIAL HYPERTENSION: ICD-10-CM

## 2022-06-21 DIAGNOSIS — Z11.4 SCREENING FOR HIV (HUMAN IMMUNODEFICIENCY VIRUS): ICD-10-CM

## 2022-06-21 DIAGNOSIS — Z11.59 NEED FOR HEPATITIS C SCREENING TEST: ICD-10-CM

## 2022-06-21 DIAGNOSIS — E66.01 CLASS 3 SEVERE OBESITY DUE TO EXCESS CALORIES WITHOUT SERIOUS COMORBIDITY WITH BODY MASS INDEX (BMI) OF 40.0 TO 44.9 IN ADULT (H): ICD-10-CM

## 2022-06-21 DIAGNOSIS — E66.813 CLASS 3 SEVERE OBESITY DUE TO EXCESS CALORIES WITHOUT SERIOUS COMORBIDITY WITH BODY MASS INDEX (BMI) OF 40.0 TO 44.9 IN ADULT (H): ICD-10-CM

## 2022-06-21 PROBLEM — G47.33 OSA (OBSTRUCTIVE SLEEP APNEA): Status: ACTIVE | Noted: 2021-08-20

## 2022-06-21 LAB
ALBUMIN UR-MCNC: ABNORMAL MG/DL
ANION GAP SERPL CALCULATED.3IONS-SCNC: 14 MMOL/L (ref 5–18)
APPEARANCE UR: CLEAR
BACTERIA #/AREA URNS HPF: ABNORMAL /HPF
BILIRUB UR QL STRIP: NEGATIVE
BUN SERPL-MCNC: 12 MG/DL (ref 8–22)
CALCIUM SERPL-MCNC: 10.2 MG/DL (ref 8.5–10.5)
CHLORIDE BLD-SCNC: 104 MMOL/L (ref 98–107)
CO2 SERPL-SCNC: 24 MMOL/L (ref 22–31)
COLOR UR AUTO: YELLOW
CREAT SERPL-MCNC: 0.94 MG/DL (ref 0.7–1.3)
GFR SERPL CREATININE-BSD FRML MDRD: >90 ML/MIN/1.73M2
GLUCOSE BLD-MCNC: 87 MG/DL (ref 70–125)
GLUCOSE UR STRIP-MCNC: NEGATIVE MG/DL
HBA1C MFR BLD: 5.8 % (ref 0–5.6)
HGB UR QL STRIP: NEGATIVE
HIV 1+2 AB+HIV1 P24 AG SERPL QL IA: NEGATIVE
KETONES UR STRIP-MCNC: NEGATIVE MG/DL
LEUKOCYTE ESTERASE UR QL STRIP: NEGATIVE
MUCOUS THREADS #/AREA URNS LPF: PRESENT /LPF
NITRATE UR QL: NEGATIVE
PH UR STRIP: 7 [PH] (ref 5–8)
POTASSIUM BLD-SCNC: 4.5 MMOL/L (ref 3.5–5)
PSA SERPL-MCNC: 1.73 UG/L (ref 0–2.5)
RBC #/AREA URNS AUTO: ABNORMAL /HPF
SODIUM SERPL-SCNC: 142 MMOL/L (ref 136–145)
SP GR UR STRIP: 1.02 (ref 1–1.03)
SQUAMOUS #/AREA URNS AUTO: ABNORMAL /LPF
UROBILINOGEN UR STRIP-ACNC: 0.2 E.U./DL
WBC #/AREA URNS AUTO: ABNORMAL /HPF

## 2022-06-21 PROCEDURE — 99214 OFFICE O/P EST MOD 30 MIN: CPT | Mod: 25 | Performed by: FAMILY MEDICINE

## 2022-06-21 PROCEDURE — 99396 PREV VISIT EST AGE 40-64: CPT | Performed by: FAMILY MEDICINE

## 2022-06-21 PROCEDURE — 81001 URINALYSIS AUTO W/SCOPE: CPT | Performed by: FAMILY MEDICINE

## 2022-06-21 PROCEDURE — 87389 HIV-1 AG W/HIV-1&-2 AB AG IA: CPT | Performed by: FAMILY MEDICINE

## 2022-06-21 PROCEDURE — 80048 BASIC METABOLIC PNL TOTAL CA: CPT | Performed by: FAMILY MEDICINE

## 2022-06-21 PROCEDURE — G0103 PSA SCREENING: HCPCS | Performed by: FAMILY MEDICINE

## 2022-06-21 PROCEDURE — 86803 HEPATITIS C AB TEST: CPT | Performed by: FAMILY MEDICINE

## 2022-06-21 PROCEDURE — 83036 HEMOGLOBIN GLYCOSYLATED A1C: CPT | Performed by: FAMILY MEDICINE

## 2022-06-21 PROCEDURE — 36415 COLL VENOUS BLD VENIPUNCTURE: CPT | Performed by: FAMILY MEDICINE

## 2022-06-21 RX ORDER — LOSARTAN POTASSIUM 25 MG/1
25 TABLET ORAL DAILY
Qty: 90 TABLET | Refills: 3 | Status: SHIPPED | OUTPATIENT
Start: 2022-06-21 | End: 2022-07-07

## 2022-06-21 ASSESSMENT — ENCOUNTER SYMPTOMS
PARESTHESIAS: 0
DYSURIA: 0
FEVER: 0
DIZZINESS: 0
MYALGIAS: 0
HEARTBURN: 0
SHORTNESS OF BREATH: 0
HEADACHES: 0
JOINT SWELLING: 0
EYE PAIN: 0
HEMATOCHEZIA: 0
DIARRHEA: 0
NERVOUS/ANXIOUS: 0
ARTHRALGIAS: 0
HEMATURIA: 0
FREQUENCY: 0
ABDOMINAL PAIN: 0
SORE THROAT: 0
CONSTIPATION: 0
COUGH: 0
CHILLS: 0
NAUSEA: 0
PALPITATIONS: 0
WEAKNESS: 0

## 2022-06-21 NOTE — PATIENT INSTRUCTIONS
Thanks for coming into the clinic again today Angel.  It is nice to see you.  Lets start losartan 25 mg daily.  Follow-up in 1 month for nurse blood pressure check and recheck labs.  If you have a home blood pressure cuff, please bring it to that appointment so we can confirm that it is accurate.  For weight management, lets plan to start working on lifestyle modifications.  I recommend my fitness pal for nutrition tracking, with a goal of a good balance macronutrient approach.  I recommend goal weight loss 1 pound per week, which is 3500 tanika/week, or 500-calorie deficit per day to lose a pound of fat per week.  I think moderate intensity aerobic exercise is a great place to start.  Walking 30 minutes a day would be wonderful.  If you are having difficulties getting traction with lifestyle modifications, we can add in medication options.  For ADHD management, with blood pressure concerns, we may want to talk about Strattera trial when the time comes.  I will send you a [employer] message when your lab results return.  I have taken pictures of some moles on your back which will now be stored in your chart.  We can cross-reference this the next time you are in clinic.  If you have skin lesions that are changing, we should touch base sooner.      Preventive Health Recommendations  Male Ages 40 to 49    Yearly exam:             See your health care provider every year in order to  o   Review health changes.   o   Discuss preventive care.    o   Review your medicines if your doctor has prescribed any.  You should be tested each year for STDs (sexually transmitted diseases) if you re at risk.   Have a cholesterol test every 5 years.   Have a colonoscopy (test for colon cancer) if someone in your family has had colon cancer or polyps before age 50.   After age 45, have a diabetes test (fasting glucose). If you are at risk for diabetes, you should have this test every 3 years.    Talk with your health care provider about  whether or not a prostate cancer screening test (PSA) is right for you.    Shots: Get a flu shot each year. Get a tetanus shot every 10 years.     Nutrition:  Eat at least 5 servings of fruits and vegetables daily.   Eat whole-grain bread, whole-wheat pasta and brown rice instead of white grains and rice.   Get adequate Calcium and Vitamin D.     Lifestyle  Exercise for at least 150 minutes a week (30 minutes a day, 5 days a week). This will help you control your weight and prevent disease.   Limit alcohol to one drink per day.   No smoking.   Wear sunscreen to prevent skin cancer.   See your dentist every six months for an exam and cleaning.

## 2022-06-21 NOTE — PROGRESS NOTES
SUBJECTIVE:   CC: Alex Land is an 46 year old male who presents for preventative health visit.     Chief Complaints and History of Present Illnesses   Patient presents with     Physical     Has been doing well. On sabbatical for the last year, quit job at [employer]. Looking to get back into work here.     ADHD - previously managed by Dr. Chaudhari. On the fence, not working has felt less bothersome. Wants to keep on the radar.     Had covid in February, was vaxed and boosted, mild symptoms, fever for 3-4 days and then cough for a month or so.     LAUREN: sleep study, now on CPAP, feels well rested.    Thinks he is going to the restroom more during the day, previously used to get up 3-4 times per night, but now sleeping through the night. Urinating more during the day.     Has a few skin tags to manage. Has moles on back as well would like to remove.     Occasionally feels pressure left upper back, left of spine, feels like he needs to crack his neck but doesn't help. Hasn't been so bothersome in the last few months.      BOWELS: 2 bowel movements per day, not constipated. No diarrhea. No blood in stool     Patient has been advised of split billing requirements and indicates understanding: Yes  Healthy Habits:     Getting at least 3 servings of Calcium per day:  NO    Bi-annual eye exam:  Yes    Dental care twice a year:  Yes    Sleep apnea or symptoms of sleep apnea:  Sleep apnea    Diet:  Regular (no restrictions)    Frequency of exercise:  None    Taking medications regularly:  Yes    Medication side effects:  Not applicable    PHQ-2 Total Score: 0    Additional concerns today:  Yes    WEIGHT: Called bariatric clinic, was skiddish on surgery, interested in nutrition component but concerned about cost.     HEALTH MAINTENANCE:   - colon: will screen   - HIV: will screen today    - Hep C: will screen today,     The 10-year ASCVD risk score (Edda ROSS Jr., et al., 2013) is: 4%    Values used to calculate the score:       Age: 46 years      Sex: Male      Is Non- : No      Diabetic: No      Tobacco smoker: No      Systolic Blood Pressure: 147 mmHg      Is BP treated: No      HDL Cholesterol: 34 mg/dL      Total Cholesterol: 195 mg/dL       Today's PHQ-2 Score:   PHQ-2 ( 1999 Pfizer) 6/20/2022   Q1: Little interest or pleasure in doing things 0   Q2: Feeling down, depressed or hopeless 0   PHQ-2 Score 0   Q1: Little interest or pleasure in doing things Not at all   Q2: Feeling down, depressed or hopeless Not at all   PHQ-2 Score 0       Abuse: Current or Past(Physical, Sexual or Emotional)- No  Do you feel safe in your environment? Yes    Have you ever done Advance Care Planning? (For example, a Health Directive, POLST, or a discussion with a medical provider or your loved ones about your wishes): Yes, patient states has an Advance Care Planning document and will bring a copy to the clinic.    Social History     Tobacco Use     Smoking status: Former Smoker     Years: 1.50     Smokeless tobacco: Never Used   Substance Use Topics     Alcohol use: Yes     If you drink alcohol do you typically have >3 drinks per day or >7 drinks per week? No    Alcohol Use 6/20/2022   Prescreen: >3 drinks/day or >7 drinks/week? No     Last PSA: No results found for: PSA    Reviewed orders with patient. Reviewed health maintenance and updated orders accordingly - Yes      Reviewed and updated as needed this visit by clinical staff   Tobacco  Allergies  Meds                Reviewed and updated as needed this visit by Provider   Tobacco  Allergies  Meds  Problems  Med Hx  Surg Hx  Fam Hx             Review of Systems   Constitutional: Negative for chills and fever.   HENT: Negative for congestion, ear pain, hearing loss and sore throat.    Eyes: Negative for pain and visual disturbance.   Respiratory: Negative for cough and shortness of breath.    Cardiovascular: Negative for chest pain, palpitations and peripheral edema.  "  Gastrointestinal: Negative for abdominal pain, constipation, diarrhea, heartburn, hematochezia and nausea.   Genitourinary: Negative for dysuria, frequency, genital sores, hematuria, impotence, penile discharge and urgency.   Musculoskeletal: Negative for arthralgias, joint swelling and myalgias.   Skin: Negative for rash.   Neurological: Negative for dizziness, weakness, headaches and paresthesias.   Psychiatric/Behavioral: Negative for mood changes. The patient is not nervous/anxious.        OBJECTIVE:   BP (!) 147/97 (BP Location: Left arm, Patient Position: Sitting, Cuff Size: Adult Large)   Pulse 78   Ht 1.899 m (6' 2.75\")   Wt 147.9 kg (326 lb)   BMI 41.02 kg/m      Physical Exam  GENERAL: healthy, alert and no distress  EYES: Eyes grossly normal to inspection, PERRL and conjunctivae and sclerae normal  HENT: ear canals and TM's normal, nose and mouth without ulcers or lesions  NECK: no adenopathy, no asymmetry, masses, or scars and thyroid normal to palpation  RESP: lungs clear to auscultation - no rales, rhonchi or wheezes  CV: regular rate and rhythm, normal S1 S2, no S3 or S4, no murmur, click or rub, no peripheral edema and peripheral pulses strong  ABDOMEN: soft, nontender, no hepatosplenomegaly, no masses and bowel sounds normal  MS: no gross musculoskeletal defects noted, no edema  SKIN: There are several pigmented lesions on patient's back.  Flat macules, light brown in color.  Borders are regular.  He has a few that have a lighter brown ring surrounding nevus.  Picture taken of mid upper back lesion.  He also has 1 flesh-colored papule with a darker brown center that is slightly irregular.  Picture taken of this lesion as well on her right upper back.  He has 2 skin tags on his right shoulder/neck.  NEURO: Normal strength and tone, mentation intact and speech normal  PSYCH: mentation appears normal, affect normal/bright      ASSESSMENT/PLAN:     1. Annual physical exam  - REVIEW OF HEALTH " MAINTENANCE PROTOCOL ORDERS    Reviewed health history and health maintenance recommendations.    2. Class 3 severe obesity due to excess calories without serious comorbidity with body mass index (BMI) of 40.0 to 44.9 in adult (H)  Would like to continue working on lifestyle modifications.  Reviewed exercise and nutrition recommendations.  Recommend he try my fitness pal.  Reviewed medication options to consider for augmentation.  Await A1c result.  Consider Wellbutrin versus metformin versus GLP-1.     3. Benign essential hypertension  - Basic metabolic panel  (Ca, Cl, CO2, Creat, Gluc, K, Na, BUN); Future  - losartan (COZAAR) 25 MG tablet; Take 1 tablet (25 mg) by mouth daily  Dispense: 90 tablet; Refill: 3  - Basic metabolic panel  (Ca, Cl, CO2, Creat, Gluc, K, Na, BUN)    Blood pressure is elevated.  Was elevated last year as well.  Reviewed blood pressure goals and first-line treatment options.  With shared decision-making, we opted to start with losartan.  Check kidney function and electrolytes.  Plan to follow-up in a month for nurse blood pressure check and recheck labs.    4. Impaired fasting glucose  - Hemoglobin A1c    A1c 5.8 last year.  Again recommend healthy lifestyle modifications and weight management.  Recheck A1c for trending today for prediabetes.    5. Urinary frequency  - UA Macro with Reflex to Micro and Culture - lab collect; Future  - Hemoglobin A1c  - PSA, screen    Evaluate for UTI versus hyperglycemia versus elevated PSA related to urinary symptoms.    6. Skin tag  Benign skin tags, will schedule procedure visit for removal.    7. Halo nevus  Obtained pictures, uploaded to media in his chart for future comparison.  Lesions are    8. Screen for colon cancer  - Adult Gastro Ref - Procedure Only; Future    9. Screening for HIV (human immunodeficiency virus)  - HIV Antigen Antibody Combo    10. Need for hepatitis C screening test  - Hepatitis C Screen Reflex to HCV RNA Quant and Genotype  "    Patient has been advised of split billing requirements and indicates understanding: Yes    COUNSELING:   Reviewed preventive health counseling, as reflected in patient instructions    Estimated body mass index is 41.02 kg/m  as calculated from the following:    Height as of this encounter: 1.899 m (6' 2.75\").    Weight as of this encounter: 147.9 kg (326 lb).     Weight management plan: Discussed healthy diet and exercise guidelines    He reports that he has quit smoking. He quit after 1.50 years of use. He has never used smokeless tobacco.      Counseling Resources:  ATP IV Guidelines  Pooled Cohorts Equation Calculator  FRAX Risk Assessment  ICSI Preventive Guidelines  Dietary Guidelines for Americans, 2010  USDA's MyPlate  ASA Prophylaxis  Lung CA Screening    Tamara Adams, [facility]  "

## 2022-06-22 LAB — HCV AB SERPL QL IA: NONREACTIVE

## 2022-06-22 NOTE — RESULT ENCOUNTER NOTE
Patient updated by [employer] message with lab results.       Wan Ortiz,  Your labs have returned and look good. Your A1c remains at 5.8, just into the prediabetes range.   Please reach out with questions or concerns.  Tamara Adams, DO

## 2022-07-07 ENCOUNTER — ALLIED HEALTH/NURSE VISIT (OUTPATIENT)
Dept: FAMILY MEDICINE | Facility: CLINIC | Age: 47
End: 2022-07-07
Payer: COMMERCIAL

## 2022-07-07 VITALS — DIASTOLIC BLOOD PRESSURE: 79 MMHG | SYSTOLIC BLOOD PRESSURE: 139 MMHG

## 2022-07-07 DIAGNOSIS — I10 BENIGN ESSENTIAL HYPERTENSION: Primary | ICD-10-CM

## 2022-07-07 PROCEDURE — 99207 PR NO CHARGE NURSE ONLY: CPT

## 2022-07-07 RX ORDER — LOSARTAN POTASSIUM 25 MG/1
50 TABLET ORAL DAILY
Qty: 90 TABLET | Refills: 3
Start: 2022-07-07 | End: 2023-01-02

## 2022-07-07 NOTE — PROGRESS NOTES
I met with Alex Land at the request of Dr. Adams to recheck his blood pressure.  Blood pressure medications on the med list were reviewed with patient.    Patient has taken all medications as per usual regimen: Yes  Patient reports tolerating them without any issues or concerns: Yes    Vitals:    07/07/22 1100   BP: 139/79       Home BP machine read - 141/86    Blood pressure was taken, previous encounter was reviewed, recorded blood pressure below 140/90.  Patient was discharged and the note will be sent to the provider for final review.

## 2022-07-07 NOTE — PROGRESS NOTES
I sent Angel a [employer] message with follow-up.   Blood pressure is improved however still borderline.  I would like to increase losartan from 25 mg daily to 50 mg daily.  Please return call to schedule follow-up blood pressure visit in another 4 to 6 weeks with lab visit for repeat BMP.  Future orders placed.    Tamara Adams, DO

## 2022-07-21 ENCOUNTER — OFFICE VISIT (OUTPATIENT)
Dept: FAMILY MEDICINE | Facility: CLINIC | Age: 47
End: 2022-07-21
Payer: COMMERCIAL

## 2022-07-21 VITALS
SYSTOLIC BLOOD PRESSURE: 125 MMHG | BODY MASS INDEX: 37.8 KG/M2 | WEIGHT: 304 LBS | DIASTOLIC BLOOD PRESSURE: 80 MMHG | HEART RATE: 85 BPM | HEIGHT: 75 IN

## 2022-07-21 DIAGNOSIS — L91.8 SKIN TAG: Primary | ICD-10-CM

## 2022-07-21 PROCEDURE — 11200 RMVL SKIN TAGS UP TO&INC 15: CPT | Performed by: FAMILY MEDICINE

## 2022-07-21 NOTE — PROGRESS NOTES
SUBJECTIVE:   Alex Land is a 46 year old male who presents today for skin tag excision. He has 2 skin tag(s) that he would like removed.  They have become irritated by rubbing from clothing.      OBJECTIVE:   Exam shows a 2 skin tag(s), on the right anterior shoulder and right anterior lateral neck. There is evidence of irritation with surrounding redness. The right shoulder lesion is approximately 8mm in diameter on a thin base. Right neck lesion is approximately 2 mm in diameter also on a thin base.      Discussed with Alex regarding technique, risk of infection, and scarring. Alcohol swab is used for cleansing. Lidocaine with epinephrine is used for anesthesia. Skin tags removed by shaved excision. Ointment and bandage applied. Alex tolerated procedure well. No complications.    1. Skin tag  - Removal of up to 15 skin tags     ASSESSMENT:   2 skin tag(s) excision.    PLAN:   Wound care instructions given. Follow up as needed.    Tamara Adams, DO

## 2022-08-25 ENCOUNTER — LAB (OUTPATIENT)
Dept: LAB | Facility: CLINIC | Age: 47
End: 2022-08-25
Payer: COMMERCIAL

## 2022-08-25 ENCOUNTER — ALLIED HEALTH/NURSE VISIT (OUTPATIENT)
Dept: FAMILY MEDICINE | Facility: CLINIC | Age: 47
End: 2022-08-25

## 2022-08-25 VITALS — DIASTOLIC BLOOD PRESSURE: 71 MMHG | SYSTOLIC BLOOD PRESSURE: 113 MMHG

## 2022-08-25 DIAGNOSIS — I10 BENIGN ESSENTIAL HYPERTENSION: Primary | ICD-10-CM

## 2022-08-25 DIAGNOSIS — I10 BENIGN ESSENTIAL HYPERTENSION: ICD-10-CM

## 2022-08-25 LAB
ANION GAP SERPL CALCULATED.3IONS-SCNC: 12 MMOL/L (ref 7–15)
BUN SERPL-MCNC: 15.1 MG/DL (ref 6–20)
CALCIUM SERPL-MCNC: 9.2 MG/DL (ref 8.6–10)
CHLORIDE SERPL-SCNC: 102 MMOL/L (ref 98–107)
CREAT SERPL-MCNC: 0.94 MG/DL (ref 0.67–1.17)
DEPRECATED HCO3 PLAS-SCNC: 23 MMOL/L (ref 22–29)
GFR SERPL CREATININE-BSD FRML MDRD: >90 ML/MIN/1.73M2
GLUCOSE SERPL-MCNC: 130 MG/DL (ref 70–99)
POTASSIUM SERPL-SCNC: 4 MMOL/L (ref 3.4–5.3)
SODIUM SERPL-SCNC: 137 MMOL/L (ref 136–145)

## 2022-08-25 PROCEDURE — 36415 COLL VENOUS BLD VENIPUNCTURE: CPT

## 2022-08-25 PROCEDURE — 80048 BASIC METABOLIC PNL TOTAL CA: CPT

## 2022-08-25 PROCEDURE — 99207 PR NO CHARGE NURSE ONLY: CPT

## 2022-08-25 NOTE — RESULT ENCOUNTER NOTE
Patient updated by [email or web address] message with lab results.      Wan Ortiz,  Thanks for coming back to recheck your blood pressure.  It looks great.  Labs also look great.  Continue current treatment plan.  Reach out by [email or web address] with questions or concerns.  Tamara Adams, DO

## 2022-08-25 NOTE — PROGRESS NOTES
Blood pressure at goal. Will update patient by [employer] results message when BMP results return.  Tamara Adams, DO

## 2022-08-25 NOTE — PROGRESS NOTES
I met with Alex Land at the request of Dr. Adams to recheck his blood pressure.  Blood pressure medications on the med list were reviewed with patient.    Patient has taken all medications as per usual regimen: Yes  Patient reports tolerating them without any issues or concerns: Yes    Vitals:    08/25/22 1019   BP: 113/71       Blood pressure was taken, previous encounter was reviewed, recorded blood pressure below 140/90.  Patient was discharged and the note will be sent to the provider for final review.

## 2022-09-23 ENCOUNTER — TRANSFERRED RECORDS (OUTPATIENT)
Dept: HEALTH INFORMATION MANAGEMENT | Facility: CLINIC | Age: 47
End: 2022-09-23

## 2022-10-01 ENCOUNTER — HEALTH MAINTENANCE LETTER (OUTPATIENT)
Age: 47
End: 2022-10-01

## 2023-01-11 DIAGNOSIS — G47.33 OSA (OBSTRUCTIVE SLEEP APNEA): Primary | ICD-10-CM

## 2023-03-10 ENCOUNTER — MYC MEDICAL ADVICE (OUTPATIENT)
Dept: FAMILY MEDICINE | Facility: CLINIC | Age: 48
End: 2023-03-10
Payer: COMMERCIAL

## 2023-03-10 ENCOUNTER — NURSE TRIAGE (OUTPATIENT)
Dept: NURSING | Facility: CLINIC | Age: 48
End: 2023-03-10

## 2023-03-10 ENCOUNTER — OFFICE VISIT (OUTPATIENT)
Dept: FAMILY MEDICINE | Facility: CLINIC | Age: 48
End: 2023-03-10
Payer: COMMERCIAL

## 2023-03-10 VITALS
HEART RATE: 91 BPM | SYSTOLIC BLOOD PRESSURE: 157 MMHG | BODY MASS INDEX: 38.17 KG/M2 | RESPIRATION RATE: 16 BRPM | OXYGEN SATURATION: 97 % | DIASTOLIC BLOOD PRESSURE: 95 MMHG | HEIGHT: 75 IN | WEIGHT: 307 LBS

## 2023-03-10 DIAGNOSIS — I10 BENIGN ESSENTIAL HYPERTENSION: Primary | ICD-10-CM

## 2023-03-10 PROCEDURE — 99214 OFFICE O/P EST MOD 30 MIN: CPT | Performed by: FAMILY MEDICINE

## 2023-03-10 RX ORDER — LOSARTAN POTASSIUM 100 MG/1
100 TABLET ORAL DAILY
Qty: 90 TABLET | Refills: 4 | Status: SHIPPED | OUTPATIENT
Start: 2023-03-10 | End: 2023-04-14

## 2023-03-10 ASSESSMENT — ANXIETY QUESTIONNAIRES
2. NOT BEING ABLE TO STOP OR CONTROL WORRYING: SEVERAL DAYS
5. BEING SO RESTLESS THAT IT IS HARD TO SIT STILL: NOT AT ALL
GAD7 TOTAL SCORE: 7
7. FEELING AFRAID AS IF SOMETHING AWFUL MIGHT HAPPEN: MORE THAN HALF THE DAYS
GAD7 TOTAL SCORE: 7
IF YOU CHECKED OFF ANY PROBLEMS ON THIS QUESTIONNAIRE, HOW DIFFICULT HAVE THESE PROBLEMS MADE IT FOR YOU TO DO YOUR WORK, TAKE CARE OF THINGS AT HOME, OR GET ALONG WITH OTHER PEOPLE: NOT DIFFICULT AT ALL
6. BECOMING EASILY ANNOYED OR IRRITABLE: SEVERAL DAYS
1. FEELING NERVOUS, ANXIOUS, OR ON EDGE: SEVERAL DAYS
3. WORRYING TOO MUCH ABOUT DIFFERENT THINGS: SEVERAL DAYS

## 2023-03-10 ASSESSMENT — PATIENT HEALTH QUESTIONNAIRE - PHQ9
5. POOR APPETITE OR OVEREATING: SEVERAL DAYS
SUM OF ALL RESPONSES TO PHQ QUESTIONS 1-9: 9

## 2023-03-10 NOTE — PATIENT INSTRUCTIONS
Increase losartan from 50 mg daily to 100 mg daily.  You can take 2 of your 50 mg tablets until you run out.  I have sent in a prescription for the 100 mg strength to the pharmacy for you.    Continue working on healthy lifestyle modifications, decreasing the salt in your diet again, eating a heart healthy nutrient dense diet, increasing physical activity.    Lets plan for a repeat nurse blood pressure check in a couple weeks to see where we are sitting.  I did also recommend checking your kidney function and potassium level after making this adjustment.  We will schedule a lab only visit at the same time.    Please do let me know if you are starting to feel lightheaded, dizzy, weak, or fatigued.  This could be a sign we are overtreating her blood pressure.  You can check your home blood pressure if you are having any of those symptoms and decrease back to 50 mg daily of losartan.

## 2023-03-10 NOTE — TELEPHONE ENCOUNTER
Provider Response to 2nd Level Triage Request    I have reviewed the RN documentation. My recommendation is:  Face To Face Visit. Same Day: work patient in on my schedule as an overbook. End of day.  Tamaar Adams, DO

## 2023-03-10 NOTE — TELEPHONE ENCOUNTER
Dr. Adams,    We could get him in for a nurse blood pressure visit as early as next week if appropriate. I know your schedule is going out a ways.    Minesh Haq RN     [facility]

## 2023-03-10 NOTE — TELEPHONE ENCOUNTER
Called and spoke with patient, assisted in adding to PCP schedule for end of day, per provider recommendations.    Lupe Mendieta RN

## 2023-03-10 NOTE — TELEPHONE ENCOUNTER
"Nurse Triage SBAR    Is this a 2nd Level Triage? YES, LICENSED PRACTITIONER REVIEW IS REQUIRED  Please call patient at [phone] (home)   Please advise if able to work patient in.    Situation:     Patient calling reporting elevated blood pressures since 3/9/23. Current B/P 192/104.    Background:     HTN on Losartan 50 mg/Patient takes in evening around 11 pm in evening.    Assessment:     Patient calling reporting elevated blood pressure readings in past 48 hours.    Stating his eye was \"blood shot\" on Saturday 3/5  which prompted him to start checking blood pressures.     B/P ranging 140-160's/? over past week.    On 3/9/23 blood pressure 185//101.    175/95 last evening.    Blood pressure rechecked during triage 192/104.     Losartan 50mg taken at 11 pm last evening. Denies missing doses since 1/2023.    Stating he has increased anxiety with elevated readings.     Denies chest pain, difficulty breathing, unsteadiness, headaches or blurry vision.    Stating he did feel pulsating in ear earlier. Pulse 68-75.     Protocol Recommended Disposition:   See in Office Today    Recommendation:     Please advise on working patient into appointment or where you prefer him to be seen.     Routed to provider    Does the patient meet one of the following criteria for ADS visit consideration? 16+ years old, with an [facility] PCP     TIP  Providers, please consider if this condition is appropriate for management at one of our Acute and Diagnostic Services sites.     If patient is a good candidate, please use dotphrase <dot>triageresponse and select Refer to ADS to document.Reason for Disposition    Systolic BP >= 180 OR Diastolic >= 110    Additional Information    Negative: Sounds like a life-threatening emergency to the triager    Negative: Symptom is main concern (e.g., headache, chest pain)    Negative: Low blood pressure is main concern    Negative: Systolic BP >= 160 OR Diastolic >= 100, and any cardiac or neurologic " symptoms (e.g., chest pain, difficulty breathing, unsteady gait, blurred vision)    Negative: Pregnant 20 or more weeks (or postpartum < 6 weeks) with new hand or face swelling    Negative: Pregnant 20 or more weeks (or postpartum < 6 weeks) and Systolic BP >= 160 OR Diastolic >= 100    Negative: Patient sounds very sick or weak to the triager    Negative: Systolic BP >= 200 OR Diastolic >= 120 and having NO cardiac or neurologic symptoms    Negative: Pregnant 20 or more weeks (or postpartum < 6 weeks) with Systolic BP >= 140 OR Diastolic >= 90    Negative: Systolic BP >= 180 OR Diastolic >= 110, and missed most recent dose of blood pressure medication    Protocols used: BLOOD PRESSURE - HIGH-A-OH

## 2023-03-10 NOTE — PROGRESS NOTES
"  Assessment & Plan     1. Benign essential hypertension  - losartan (COZAAR) 100 MG tablet; Take 1 tablet (100 mg) by mouth daily  Dispense: 90 tablet; Refill: 4  - Basic metabolic panel  (Ca, Cl, CO2, Creat, Gluc, K, Na, BUN); Future     Blood pressure uncontrolled.  Asymptomatic.  Increase losartan from 50 mg daily to 100 mg daily.  Return for nurse blood pressure check with recheck labs in 3 to 4 weeks.    Return in about 3 months (around 6/21/2023) for Physical Exam / sooner as needed .    Tamara Adams [facility]    Markus Ortiz is a 47 year old presenting for the following health issues:  Hypertension      History of Present Illness       Hypertension: He presents for follow up of hypertension.  He does not check blood pressure  regularly outside of the clinic. Outside blood pressures have been over 140/90. He does not follow a low salt diet.     He eats 0-1 servings of fruits and vegetables daily.He consumes 1 sweetened beverage(s) daily.He exercises with enough effort to increase his heart rate 9 or less minutes per day.  He exercises with enough effort to increase his heart rate 3 or less days per week.   He is taking medications regularly.       Checked home BP cuff, higher than the clinic one.   Prompted to check his home BP after waking up with an injected appearing eye.   Has made some regression in his diet, poor choices, more salt.     Review of Systems   See HPI above.       Objective    BP (!) 157/95 (BP Location: Left arm, Patient Position: Sitting, Cuff Size: Adult Large)   Pulse 91   Resp 16   Ht 1.892 m (6' 2.5\")   Wt 139.3 kg (307 lb)   SpO2 97%   BMI 38.89 kg/m    Body mass index is 38.89 kg/m .  Physical Exam   GENERAL: healthy, alert and no distress  RESP: lungs clear to auscultation - no rales, rhonchi or wheezes  CV: regular rate and rhythm, normal S1 S2, no S3 or S4, no murmur, click or rub, no peripheral edema and peripheral pulses " strong  MS: no gross musculoskeletal defects noted, no edema

## 2023-03-10 NOTE — TELEPHONE ENCOUNTER
Please triage a little further, he should check blood pressure readings a few times a day for the next few days.   Nurse blood pressure check next week with home cuff would be great to confirm accuracy.     Tamara Adams, DO

## 2023-03-24 ENCOUNTER — MYC MEDICAL ADVICE (OUTPATIENT)
Dept: FAMILY MEDICINE | Facility: CLINIC | Age: 48
End: 2023-03-24

## 2023-03-24 ENCOUNTER — ALLIED HEALTH/NURSE VISIT (OUTPATIENT)
Dept: FAMILY MEDICINE | Facility: CLINIC | Age: 48
End: 2023-03-24
Payer: COMMERCIAL

## 2023-03-24 VITALS
BODY MASS INDEX: 36.76 KG/M2 | DIASTOLIC BLOOD PRESSURE: 70 MMHG | HEART RATE: 70 BPM | WEIGHT: 290.19 LBS | SYSTOLIC BLOOD PRESSURE: 133 MMHG

## 2023-03-24 DIAGNOSIS — I10 BENIGN ESSENTIAL HYPERTENSION: Primary | ICD-10-CM

## 2023-03-24 PROCEDURE — 99207 PR NO CHARGE NURSE ONLY: CPT

## 2023-03-24 NOTE — PROGRESS NOTES
Patient updated by [employer] message, blood pressure looks great.  Continue current plan.  Encouraged to schedule lab only appointment to complete the BMP that was previously ordered for today.  Tamara Adams, DO

## 2023-03-24 NOTE — PROGRESS NOTES
I met with Alex Land at the request of Dr. Adams to recheck his blood pressure.  Blood pressure medications on the med list were reviewed with patient.    Patient has taken all medications as per usual regimen: Yes  Patient reports tolerating them without any issues or concerns: He noticed that after the dosage change with Losartan, he was walking for 90 min to 2 hours, he had tinnitus in his left ear. Didn't last long. Then it felt like his ear was plugged like when you get off a plane. No symptoms currently.   He also started a low sodium diet 1500 mg. Checked his weight today and stated that he lost about 30 lbs.    Vitals:    03/24/23 1013   BP: 133/70   BP Location: Left arm   Patient Position: Sitting   Cuff Size: Adult Large   Pulse: 70   Weight: 131.6 kg (290 lb 3 oz)       Blood pressure was taken, previous encounter was reviewed, recorded blood pressure below 140/90.  Patient was discharged and the note will be sent to the provider for final review.    Pt also brought in his personal BP machine:  130/75  76

## 2023-03-29 ENCOUNTER — LAB (OUTPATIENT)
Dept: LAB | Facility: CLINIC | Age: 48
End: 2023-03-29
Payer: COMMERCIAL

## 2023-03-29 ENCOUNTER — ALLIED HEALTH/NURSE VISIT (OUTPATIENT)
Dept: FAMILY MEDICINE | Facility: CLINIC | Age: 48
End: 2023-03-29
Payer: COMMERCIAL

## 2023-03-29 VITALS — SYSTOLIC BLOOD PRESSURE: 115 MMHG | DIASTOLIC BLOOD PRESSURE: 74 MMHG | HEART RATE: 72 BPM

## 2023-03-29 DIAGNOSIS — I10 BENIGN ESSENTIAL HYPERTENSION: ICD-10-CM

## 2023-03-29 DIAGNOSIS — I10 BENIGN ESSENTIAL HYPERTENSION: Primary | ICD-10-CM

## 2023-03-29 LAB
ANION GAP SERPL CALCULATED.3IONS-SCNC: 11 MMOL/L (ref 7–15)
BUN SERPL-MCNC: 12.6 MG/DL (ref 6–20)
CALCIUM SERPL-MCNC: 9.6 MG/DL (ref 8.6–10)
CHLORIDE SERPL-SCNC: 103 MMOL/L (ref 98–107)
CREAT SERPL-MCNC: 1.01 MG/DL (ref 0.67–1.17)
DEPRECATED HCO3 PLAS-SCNC: 24 MMOL/L (ref 22–29)
GFR SERPL CREATININE-BSD FRML MDRD: >90 ML/MIN/1.73M2
GLUCOSE SERPL-MCNC: 125 MG/DL (ref 70–99)
POTASSIUM SERPL-SCNC: 4.8 MMOL/L (ref 3.4–5.3)
SODIUM SERPL-SCNC: 138 MMOL/L (ref 136–145)

## 2023-03-29 PROCEDURE — 99207 PR NO CHARGE NURSE ONLY: CPT

## 2023-03-29 PROCEDURE — 36415 COLL VENOUS BLD VENIPUNCTURE: CPT

## 2023-03-29 PROCEDURE — 80048 BASIC METABOLIC PNL TOTAL CA: CPT

## 2023-03-29 NOTE — PROGRESS NOTES
I met with Alex Land at the request of Dr. Adams to recheck his blood pressure.  Blood pressure medications on the med list were reviewed with patient.    Patient has taken all medications as per usual regimen: Yes  Patient reports tolerating them without any issues or concerns: Yes    Vitals:    03/29/23 1153   BP: 115/74       Blood pressure was taken, previous encounter was reviewed, recorded blood pressure below 140/90.  Patient was discharged and the note will be sent to the provider for final review.

## 2023-03-30 NOTE — RESULT ENCOUNTER NOTE
Patient updated by [employer] message with lab results.       Labs look good. Sugar up a touch. We should repeat an A1c at appointment in June.  Tamara Adams, DO

## 2023-03-30 NOTE — PROGRESS NOTES
BP looks great. Updated patient by [employer] message. Continue current treatment plan.  Tamara Adams, DO

## 2023-03-31 NOTE — TELEPHONE ENCOUNTER
"  Problem: Anxiety  Goal: Anxiety Reduction or Resolution  Outcome: Improving     Problem: Depression  Goal: Improved Mood  Outcome: Improving     Problem: Suicidal Behavior  Goal: Suicidal Behavior is Absent or Managed  Outcome: Improving  Pt. is calm and pleasant, has flat affect but bright on approach, make good eye contact during conversation. Spent the entire shift on the recliner chair watching TV. Rated his anxiety and depression as \"low\" Pt. denies SI, SIB and hallucination. Pt. stated his right hip pain is better today but received PRN ibuprofen for tooth pain, pt. reported that was helpful, he uses walker for ambulation. Pt. refused lab (Platelet count, TSH with free T4 reflex, Vit. D, Vit. B12, and folate), and his BG check again today, education provided, pt. stated, \"Refusing the blood draw because it is my Bahai belief\", lab called and they are informed. Appetite and sleep is good. Continue to monitor pt. as POC.  " If he can make it, you can offer to add on to the end of my day today.   We could rule out impaction, check for signs of eustachian tube dysfunction.  Tamara Adams, DO

## 2023-04-05 NOTE — TELEPHONE ENCOUNTER
Attempted to call patient, left message for return call to clinic. Please see provider notation below when patient returns call.    Lupe Mendieta RN

## 2023-04-05 NOTE — TELEPHONE ENCOUNTER
"Patient returned call. States that his symptoms seem \"steady\". Denies any worsening or symptoms of new onset. Offered sooner appointment with another provider but he would like to wait and see PCP. Assisted in scheduling appointment with PCP for 4/14 for evaluation. Encouraged call back with any change or worsening of symptoms. Patient stated understanding and is in agreement with plan.    Lupe Mendieta RN    "

## 2023-04-14 ENCOUNTER — OFFICE VISIT (OUTPATIENT)
Dept: FAMILY MEDICINE | Facility: CLINIC | Age: 48
End: 2023-04-14
Payer: COMMERCIAL

## 2023-04-14 VITALS
HEIGHT: 75 IN | BODY MASS INDEX: 35.08 KG/M2 | TEMPERATURE: 97.9 F | SYSTOLIC BLOOD PRESSURE: 105 MMHG | OXYGEN SATURATION: 98 % | DIASTOLIC BLOOD PRESSURE: 68 MMHG | WEIGHT: 282.13 LBS | RESPIRATION RATE: 12 BRPM | HEART RATE: 66 BPM

## 2023-04-14 DIAGNOSIS — I10 BENIGN ESSENTIAL HYPERTENSION: ICD-10-CM

## 2023-04-14 DIAGNOSIS — H93.12 TINNITUS, LEFT: Primary | ICD-10-CM

## 2023-04-14 PROBLEM — D12.3 BENIGN NEOPLASM OF TRANSVERSE COLON: Status: ACTIVE | Noted: 2022-09-27

## 2023-04-14 PROBLEM — K63.5 POLYP OF COLON: Status: ACTIVE | Noted: 2022-09-23

## 2023-04-14 PROCEDURE — 99213 OFFICE O/P EST LOW 20 MIN: CPT | Performed by: FAMILY MEDICINE

## 2023-04-14 RX ORDER — LOSARTAN POTASSIUM 100 MG/1
50 TABLET ORAL DAILY
Qty: 90 TABLET | Refills: 4
Start: 2023-04-14 | End: 2023-05-02
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
## 2023-04-14 NOTE — PATIENT INSTRUCTIONS
Cut losartan 100mg in half to take 50mg daily.   Trial Flonase 2 sprays each nostril once daily for a few weeks.   Keep me posted on blood pressure control and symptoms.  If persistent, we will seek ENT evaluation.

## 2023-05-01 ENCOUNTER — MYC MEDICAL ADVICE (OUTPATIENT)
Dept: FAMILY MEDICINE | Facility: CLINIC | Age: 48
End: 2023-05-01
Payer: COMMERCIAL

## 2023-05-01 DIAGNOSIS — I10 BENIGN ESSENTIAL HYPERTENSION: ICD-10-CM

## 2023-05-01 DIAGNOSIS — H93.19 TINNITUS, UNSPECIFIED LATERALITY: Primary | ICD-10-CM

## 2023-05-02 RX ORDER — LOSARTAN POTASSIUM 50 MG/1
50 TABLET ORAL DAILY
Qty: 90 TABLET | Refills: 3 | Status: SHIPPED | OUTPATIENT
Start: 2023-05-02 | End: 2024-04-12

## 2023-05-02 NOTE — TELEPHONE ENCOUNTER
FUTURE VISIT INFORMATION      FUTURE VISIT INFORMATION:    Date: 6/29/23    Time: 1:10pm    [place] [facility] [place]:    Referring provider:  4/14/23    Referring providers clinic:  [facility]     Reason for visit/diagnosis  Per Pt, dx Tinnitus, unspecified laterality [H93.19] referral from PCP recs in epics    RECORDS REQUESTED FROM:       Clinic name Comments Records Status Imaging Status   [facility] 4/14/23- note with Tamara Adams, DO Epic

## 2023-05-23 ENCOUNTER — TRANSFERRED RECORDS (OUTPATIENT)
Dept: HEALTH INFORMATION MANAGEMENT | Facility: CLINIC | Age: 48
End: 2023-05-23
Payer: COMMERCIAL

## 2023-06-06 ENCOUNTER — HOSPITAL ENCOUNTER (OUTPATIENT)
Dept: MRI IMAGING | Facility: HOSPITAL | Age: 48
Discharge: HOME | End: 2023-06-06
Attending: PHYSICIAN ASSISTANT | Admitting: PHYSICIAN ASSISTANT
Payer: COMMERCIAL

## 2023-06-06 DIAGNOSIS — H90.3 BILATERAL SENSORINEURAL HEARING LOSS: ICD-10-CM

## 2023-06-06 PROCEDURE — 70553 MRI BRAIN STEM W/O & W/DYE: CPT

## 2023-06-06 PROCEDURE — 255N000002 HC RX 255 OP 636

## 2023-06-06 PROCEDURE — A9585 GADOBUTROL INJECTION: HCPCS

## 2023-06-06 RX ORDER — GADOBUTROL 604.72 MG/ML
0.1 INJECTION INTRAVENOUS ONCE
Status: COMPLETED | OUTPATIENT
Start: 2023-06-06 | End: 2023-06-06

## 2023-06-06 RX ADMIN — GADOBUTROL 13 ML: 604.72 INJECTION INTRAVENOUS at 18:37

## 2023-06-08 NOTE — RESULT ENCOUNTER NOTE
Patient updated by [employer] message with lab results.       Wan Ortiz,  I am glad to report that your MRI results have returned normal.  Tamara Adams, DO  Detail Level: Detailed -Erythema is concerning for possible superimposed infection. Prescribed doxycycline 100 mg BID x 10 days. No drug allergies known, and no drug interactions per Up to Date interaction .\\n-Stressed importance of wearing compression stockings daily. Remove at night. Elevate legs twice daily for at least 20 minutes per session, with legs above level of the heart.\\n-Continue to apply clobetasol 0.05% ointment liberally to affected areas of lower extremities (avoiding open sores) twice daily as needed for up to two weeks. Take one week break before using again. \\n-Continue to apply mupirocin 2% ointment twice daily as needed to open sores on legs\\n-Moisturize legs at least twice daily with thick cream such as Cetaphil\\n-F/U in two months for further evaluation and management, or sooner should stasis dermatitis worsen or fail to improve. All questions and concerns addressed. Patient voices understanding and agrees to plan.

## 2023-06-22 ENCOUNTER — OFFICE VISIT (OUTPATIENT)
Dept: FAMILY MEDICINE | Facility: CLINIC | Age: 48
End: 2023-06-22
Payer: COMMERCIAL

## 2023-06-22 VITALS
HEIGHT: 75 IN | WEIGHT: 273 LBS | DIASTOLIC BLOOD PRESSURE: 69 MMHG | SYSTOLIC BLOOD PRESSURE: 119 MMHG | HEART RATE: 66 BPM | TEMPERATURE: 98.1 F | BODY MASS INDEX: 33.94 KG/M2 | OXYGEN SATURATION: 96 % | RESPIRATION RATE: 12 BRPM

## 2023-06-22 DIAGNOSIS — G47.33 OSA (OBSTRUCTIVE SLEEP APNEA): ICD-10-CM

## 2023-06-22 DIAGNOSIS — I10 BENIGN ESSENTIAL HYPERTENSION: ICD-10-CM

## 2023-06-22 DIAGNOSIS — Z00.00 ANNUAL PHYSICAL EXAM: Primary | ICD-10-CM

## 2023-06-22 DIAGNOSIS — E66.811 CLASS 1 OBESITY WITH SERIOUS COMORBIDITY AND BODY MASS INDEX (BMI) OF 34.0 TO 34.9 IN ADULT, UNSPECIFIED OBESITY TYPE: ICD-10-CM

## 2023-06-22 DIAGNOSIS — R73.03 PREDIABETES: ICD-10-CM

## 2023-06-22 DIAGNOSIS — E78.5 DYSLIPIDEMIA: ICD-10-CM

## 2023-06-22 PROBLEM — E66.01 CLASS 3 SEVERE OBESITY DUE TO EXCESS CALORIES WITHOUT SERIOUS COMORBIDITY WITH BODY MASS INDEX (BMI) OF 40.0 TO 44.9 IN ADULT (H): Status: RESOLVED | Noted: 2021-05-13 | Resolved: 2023-06-22

## 2023-06-22 PROBLEM — E66.813 CLASS 3 SEVERE OBESITY DUE TO EXCESS CALORIES WITHOUT SERIOUS COMORBIDITY WITH BODY MASS INDEX (BMI) OF 40.0 TO 44.9 IN ADULT (H): Status: RESOLVED | Noted: 2021-05-13 | Resolved: 2023-06-22

## 2023-06-22 LAB
ALBUMIN SERPL BCG-MCNC: 4.5 G/DL (ref 3.5–5.2)
ALP SERPL-CCNC: 77 U/L (ref 40–129)
ALT SERPL W P-5'-P-CCNC: 19 U/L (ref 0–70)
ANION GAP SERPL CALCULATED.3IONS-SCNC: 11 MMOL/L (ref 7–15)
AST SERPL W P-5'-P-CCNC: 23 U/L (ref 0–45)
BILIRUB SERPL-MCNC: 0.3 MG/DL
BUN SERPL-MCNC: 13.9 MG/DL (ref 6–20)
CALCIUM SERPL-MCNC: 9.4 MG/DL (ref 8.6–10)
CHLORIDE SERPL-SCNC: 105 MMOL/L (ref 98–107)
CHOLEST SERPL-MCNC: 174 MG/DL
CREAT SERPL-MCNC: 0.93 MG/DL (ref 0.67–1.17)
DEPRECATED HCO3 PLAS-SCNC: 24 MMOL/L (ref 22–29)
GFR SERPL CREATININE-BSD FRML MDRD: >90 ML/MIN/1.73M2
GLUCOSE SERPL-MCNC: 109 MG/DL (ref 70–99)
HBA1C MFR BLD: 5.3 % (ref 0–5.6)
HDLC SERPL-MCNC: 30 MG/DL
HOLD SPECIMEN: NORMAL
LDLC SERPL CALC-MCNC: 78 MG/DL
NONHDLC SERPL-MCNC: 144 MG/DL
POTASSIUM SERPL-SCNC: 4.7 MMOL/L (ref 3.4–5.3)
PROT SERPL-MCNC: 7.5 G/DL (ref 6.4–8.3)
SODIUM SERPL-SCNC: 140 MMOL/L (ref 136–145)
TRIGL SERPL-MCNC: 331 MG/DL

## 2023-06-22 PROCEDURE — 80061 LIPID PANEL: CPT | Performed by: FAMILY MEDICINE

## 2023-06-22 PROCEDURE — 99213 OFFICE O/P EST LOW 20 MIN: CPT | Mod: 25 | Performed by: FAMILY MEDICINE

## 2023-06-22 PROCEDURE — 99396 PREV VISIT EST AGE 40-64: CPT | Performed by: FAMILY MEDICINE

## 2023-06-22 PROCEDURE — 36415 COLL VENOUS BLD VENIPUNCTURE: CPT | Performed by: FAMILY MEDICINE

## 2023-06-22 PROCEDURE — 83036 HEMOGLOBIN GLYCOSYLATED A1C: CPT | Performed by: FAMILY MEDICINE

## 2023-06-22 PROCEDURE — 87340 HEPATITIS B SURFACE AG IA: CPT | Performed by: FAMILY MEDICINE

## 2023-06-22 PROCEDURE — 80053 COMPREHEN METABOLIC PANEL: CPT | Performed by: FAMILY MEDICINE

## 2023-06-22 PROCEDURE — 86706 HEP B SURFACE ANTIBODY: CPT | Performed by: FAMILY MEDICINE

## 2023-06-22 RX ORDER — CYANOCOBALAMIN (VITAMIN B-12) 2500 MCG
TABLET, SUBLINGUAL SUBLINGUAL
COMMUNITY
Start: 2023-04-22

## 2023-06-22 RX ORDER — FLUTICASONE PROPIONATE 50 MCG
SPRAY, SUSPENSION (ML) NASAL
COMMUNITY
Start: 2023-06-02

## 2023-06-22 ASSESSMENT — ENCOUNTER SYMPTOMS
HEADACHES: 0
JOINT SWELLING: 0
DIARRHEA: 0
NERVOUS/ANXIOUS: 0
CHILLS: 0
HEMATOCHEZIA: 0
WEAKNESS: 0
PALPITATIONS: 0
ARTHRALGIAS: 1
EYE PAIN: 0
ABDOMINAL PAIN: 0
DIZZINESS: 0
SHORTNESS OF BREATH: 0
HEARTBURN: 0
HEMATURIA: 0
COUGH: 0
CONSTIPATION: 0
NAUSEA: 0
SORE THROAT: 0
FEVER: 0
FREQUENCY: 0
DYSURIA: 0
MYALGIAS: 0
PARESTHESIAS: 1

## 2023-06-22 ASSESSMENT — ANXIETY QUESTIONNAIRES
GAD7 TOTAL SCORE: 2
1. FEELING NERVOUS, ANXIOUS, OR ON EDGE: NOT AT ALL
GAD7 TOTAL SCORE: 2
8. IF YOU CHECKED OFF ANY PROBLEMS, HOW DIFFICULT HAVE THESE MADE IT FOR YOU TO DO YOUR WORK, TAKE CARE OF THINGS AT HOME, OR GET ALONG WITH OTHER PEOPLE?: NOT DIFFICULT AT ALL
7. FEELING AFRAID AS IF SOMETHING AWFUL MIGHT HAPPEN: NOT AT ALL
7. FEELING AFRAID AS IF SOMETHING AWFUL MIGHT HAPPEN: NOT AT ALL
4. TROUBLE RELAXING: NOT AT ALL
IF YOU CHECKED OFF ANY PROBLEMS ON THIS QUESTIONNAIRE, HOW DIFFICULT HAVE THESE PROBLEMS MADE IT FOR YOU TO DO YOUR WORK, TAKE CARE OF THINGS AT HOME, OR GET ALONG WITH OTHER PEOPLE: NOT DIFFICULT AT ALL
6. BECOMING EASILY ANNOYED OR IRRITABLE: SEVERAL DAYS
GAD7 TOTAL SCORE: 2
5. BEING SO RESTLESS THAT IT IS HARD TO SIT STILL: NOT AT ALL
3. WORRYING TOO MUCH ABOUT DIFFERENT THINGS: SEVERAL DAYS
2. NOT BEING ABLE TO STOP OR CONTROL WORRYING: NOT AT ALL

## 2023-06-22 ASSESSMENT — PATIENT HEALTH QUESTIONNAIRE - PHQ9
SUM OF ALL RESPONSES TO PHQ QUESTIONS 1-9: 2
SUM OF ALL RESPONSES TO PHQ QUESTIONS 1-9: 2
10. IF YOU CHECKED OFF ANY PROBLEMS, HOW DIFFICULT HAVE THESE PROBLEMS MADE IT FOR YOU TO DO YOUR WORK, TAKE CARE OF THINGS AT HOME, OR GET ALONG WITH OTHER PEOPLE: NOT DIFFICULT AT ALL

## 2023-06-22 NOTE — PROGRESS NOTES
SUBJECTIVE:   CC: Angel is an 47 year old who presents for preventative health visit.     Chief Complaints and History of Present Illnesses   Patient presents with     Physical            6/22/2023     2:26 PM   Additional Questions   Roomed by Regina CASTANEDA CMA   Accompanied by Self     Healthy Habits:     Getting at least 3 servings of Calcium per day:  Yes    Bi-annual eye exam:  Yes    Dental care twice a year:  Yes    Sleep apnea or symptoms of sleep apnea:  Sleep apnea    Diet:  Low salt    Frequency of exercise:  6-7 days/week    Duration of exercise:  45-60 minutes    Taking medications regularly:  Yes    Medication side effects:  None    PHQ-2 Total Score: 0    Additional concerns today:  Yes        Social History     Tobacco Use     Smoking status: Former     Packs/day: 0.50     Years: 1.50     Pack years: 0.75     Types: Cigarettes     Start date: 1/1/2011     Quit date: 9/1/2012     Years since quitting: 10.8     Smokeless tobacco: Never     Tobacco comments:     Stopped over a decade ago. No desire to return.   Substance Use Topics     Alcohol use: Yes     Comment: Social with friends only. Rarely to never at home.             6/22/2023    12:14 PM   Alcohol Use   Prescreen: >3 drinks/day or >7 drinks/week? Not Applicable       Last PSA:   Prostate Specific Antigen Screen   Date Value Ref Range Status   06/21/2022 1.73 0.00 - 2.50 ug/L Final       Reviewed orders with patient. Reviewed health maintenance and updated orders accordingly - Yes      Reviewed and updated as needed this visit by clinical staff   Tobacco  Allergies  Meds              Reviewed and updated as needed this visit by Provider   Tobacco  Allergies  Meds  Problems  Med Hx  Surg Hx  Fam Hx           Review of Systems   Constitutional: Negative for chills and fever.   HENT: Positive for congestion and hearing loss. Negative for ear pain and sore throat.    Eyes: Negative for pain and visual disturbance.   Respiratory: Negative  "for cough and shortness of breath.    Cardiovascular: Negative for chest pain, palpitations and peripheral edema.   Gastrointestinal: Negative for abdominal pain, constipation, diarrhea, heartburn, hematochezia and nausea.   Genitourinary: Negative for dysuria, frequency, genital sores, hematuria, impotence, penile discharge and urgency.   Musculoskeletal: Positive for arthralgias. Negative for joint swelling and myalgias.   Skin: Negative for rash.   Neurological: Positive for paresthesias. Negative for dizziness, weakness and headaches.   Psychiatric/Behavioral: Negative for mood changes. The patient is not nervous/anxious.      Had head fullness, tinnitus, saw ENT, did MRI and audiology (loss high pitch). Doing okay, white noise machine to sleep. Using treatment recommended by ENT for nasal congestion.    Bad knees chronically, prior MRIs, was a candidate for knee replacement but wanting to wait.     Paresthesias: just ear    OBJECTIVE:   /69 (BP Location: Left arm, Patient Position: Sitting, Cuff Size: Adult Regular)   Pulse 66   Temp 98.1  F (36.7  C) (Oral)   Resp 12   Ht 1.892 m (6' 2.5\")   Wt 123.8 kg (273 lb)   SpO2 96%   BMI 34.58 kg/m      Physical Exam  GENERAL: healthy, alert and no distress  EYES: Eyes grossly normal to inspection, PERRL and conjunctivae and sclerae normal  HENT: ear canals and TM's normal, nose and mouth without ulcers or lesions  NECK: no adenopathy, no asymmetry, masses, or scars and thyroid normal to palpation  RESP: lungs clear to auscultation - no rales, rhonchi or wheezes  CV: regular rate and rhythm, normal S1 S2, no S3 or S4, no murmur, click or rub, no peripheral edema and peripheral pulses strong  ABDOMEN: soft, nontender, no hepatosplenomegaly, no masses and bowel sounds normal  MS: no gross musculoskeletal defects noted, no edema  SKIN: no suspicious lesions or rashes  NEURO: Normal strength and tone, mentation intact and speech normal  PSYCH: mentation " "appears normal, affect normal/bright        ASSESSMENT/PLAN:     1. Annual physical exam  - REVIEW OF HEALTH MAINTENANCE PROTOCOL ORDERS  - Hepatitis B Surface Antibody  - Hepatitis B surface antigen    Reviewed health history and health maintenance recommendations.  Screen for hepatitis B immunity. Would consider vaccination if not immune.     2. Hx of Prediabetes  - Hemoglobin A1c    After nearly 60 lbs of weight loss over the last 16 months, A1c has normalized.     3. Dyslipidemia  - Lipid panel reflex to direct LDL Fasting  - Comprehensive metabolic panel (BMP + Alb, Alk Phos, ALT, AST, Total. Bili, TP)    Recheck labs to update ASCVD risk profile.     4. Benign essential hypertension  - Comprehensive metabolic panel (BMP + Alb, Alk Phos, ALT, AST, Total. Bili, TP)    BP at goal, consider decreasing dose of losartan after weight loss. Patient will continue to monitor home readings.     5. LAUREN (obstructive sleep apnea) - on CPAP  On CPAP.     6. Class 1 obesity with serious comorbidity and body mass index (BMI) of 34.0 to 34.9 in adult, unspecified obesity type  - Lipid panel reflex to direct LDL Fasting  - Comprehensive metabolic panel (BMP + Alb, Alk Phos, ALT, AST, Total. Bili, TP)      Max weight 330 lb on 2/10/22. Now down to 273 lb through lifestyle modifications alone. Doing well. Keep up the great work. Can consider augmentation with medication if hits plateau.     Patient has been advised of split billing requirements and indicates understanding: Yes      COUNSELING:   Reviewed preventive health counseling, as reflected in patient instructions      BMI:   Estimated body mass index is 34.58 kg/m  as calculated from the following:    Height as of this encounter: 1.892 m (6' 2.5\").    Weight as of this encounter: 123.8 kg (273 lb).   Weight management plan: Discussed healthy diet and exercise guidelines      He reports that he quit smoking about 10 years ago. His smoking use included cigarettes. He started " smoking about 12 years ago. He has a 0.75 pack-year smoking history. He has never used smokeless tobacco.        Tamara Adams [facility]

## 2023-06-22 NOTE — PATIENT INSTRUCTIONS
Consider decreasing losartan to 25mg daily (half tablet) instead of 50mg daily and trend blood pressures. If persistently above 130 for the top number or 80 for the bottom number, go back to 50mg daily. If doing well at lower dose, send me an update and I can adjust the prescription.       Preventive Health Recommendations  Male Ages 40 to 49    Yearly exam:             See your health care provider every year in order to  o   Review health changes.   o   Discuss preventive care.    o   Review your medicines if your doctor has prescribed any.  You should be tested each year for STDs (sexually transmitted diseases) if you re at risk.   Have a cholesterol test every 5 years.   Have a colonoscopy (test for colon cancer) if someone in your family has had colon cancer or polyps before age 50.   After age 45, have a diabetes test (fasting glucose). If you are at risk for diabetes, you should have this test every 3 years.    Talk with your health care provider about whether or not a prostate cancer screening test (PSA) is right for you.    Shots: Get a flu shot each year. Get a tetanus shot every 10 years.     Nutrition:  Eat at least 5 servings of fruits and vegetables daily.   Eat whole-grain bread, whole-wheat pasta and brown rice instead of white grains and rice.   Get adequate Calcium and Vitamin D.     Lifestyle  Exercise for at least 150 minutes a week (30 minutes a day, 5 days a week). This will help you control your weight and prevent disease.   Limit alcohol to one drink per day.   No smoking.   Wear sunscreen to prevent skin cancer.   See your dentist every six months for an exam and cleaning.

## 2023-06-23 LAB
HBV SURFACE AB SERPL IA-ACNC: 44.28 M[IU]/ML
HBV SURFACE AB SERPL IA-ACNC: REACTIVE M[IU]/ML
HBV SURFACE AG SERPL QL IA: NONREACTIVE

## 2023-06-24 NOTE — RESULT ENCOUNTER NOTE
The 10-year ASCVD risk score (Rambo JONES, et al., 2019) is: 3.4%  Patient updated by [employer] message with lab results.       Wan Ortiz,  Labs look great.  1. You are immune to hepatitis B. We do not need to vaccinate.   2. Cholesterol labs are improved from last check, especially the decrease in LDL. Great to see. Your triglycerides were mildly elevated. This certainly can be seen in a non-fasting sample, though if you were fasting it is still below the treatment level. You could consider adding in a fish oil supplement or try cutting back on saturated fat and simple carbohydrates to help decrease the triglyceride levels. We can trend this next year.  Remaining labs look great. Again, super happy to see the A1c drop into the normal range. Keep up the great work.   Reach out by [employer] with any follow up questions or concerns.  Tamara Adams, DO

## 2023-06-28 DIAGNOSIS — H93.12 TINNITUS, LEFT: Primary | ICD-10-CM

## 2023-06-29 ENCOUNTER — OFFICE VISIT (OUTPATIENT)
Dept: AUDIOLOGY | Facility: CLINIC | Age: 48
End: 2023-06-29
Payer: COMMERCIAL

## 2023-06-29 ENCOUNTER — PRE VISIT (OUTPATIENT)
Dept: OTOLARYNGOLOGY | Facility: CLINIC | Age: 48
End: 2023-06-29

## 2023-06-29 ENCOUNTER — OFFICE VISIT (OUTPATIENT)
Dept: OTOLARYNGOLOGY | Facility: CLINIC | Age: 48
End: 2023-06-29
Payer: COMMERCIAL

## 2023-06-29 VITALS
HEIGHT: 75 IN | DIASTOLIC BLOOD PRESSURE: 78 MMHG | SYSTOLIC BLOOD PRESSURE: 117 MMHG | WEIGHT: 273 LBS | BODY MASS INDEX: 33.94 KG/M2 | OXYGEN SATURATION: 97 % | HEART RATE: 76 BPM

## 2023-06-29 DIAGNOSIS — H93.12 TINNITUS, LEFT: Primary | ICD-10-CM

## 2023-06-29 DIAGNOSIS — H93.19 TINNITUS, UNSPECIFIED LATERALITY: ICD-10-CM

## 2023-06-29 PROCEDURE — 92557 COMPREHENSIVE HEARING TEST: CPT | Performed by: AUDIOLOGIST

## 2023-06-29 PROCEDURE — 92550 TYMPANOMETRY & REFLEX THRESH: CPT | Performed by: AUDIOLOGIST

## 2023-06-29 PROCEDURE — 92565 STENGER TEST PURE TONE: CPT | Performed by: AUDIOLOGIST

## 2023-06-29 PROCEDURE — 99203 OFFICE O/P NEW LOW 30 MIN: CPT | Performed by: REGISTERED NURSE

## 2023-06-29 ASSESSMENT — PAIN SCALES - GENERAL: PAINLEVEL: NO PAIN (0)

## 2023-06-29 NOTE — LETTER
6/29/2023       RE: Alex Land  [address]  [facility] [address]     Dear Colleague,    Thank you for referring your patient, Alex Land, to the [facility] [place] at [facility]. Please see a copy of my visit note below.      Otolaryngology Clinic  June 29, 2023    Chief Complaint:   Left tinnitus      History of Present Illness:   Alex Land is a 47 year old male who presents today for evaluation of bilateral and left tinnitus.  Patient reports an onset of bilateral tinnitus after a flight in February 2023. Patient also reports an acute onset of left-sided tinnitus in March 2023.  Around the same time, patient does report an increase in blood pressure with an adjustment of blood pressure medications.  Patient states that tinnitus was very bothersome at that time affecting patient's ability to fall asleep and stay asleep at night.  Due to unilateral tinnitus and concern of new onset, patient had an MRI of the brain which was negative.  Since that time, has decreased he is back to his normal dose of medication.  The severity of tinnitus has also improved and it is barely noticeable at this time unless patient thinks about it or focuses on symptoms.    Patient denies any otalgia, otorrhea, dizziness, hearing loss, facial numbness/weakness, history of frequent ear infections, or ear surgeries.  Mother with hearing loss and wears hearing aids.      Past Medical History:  Past Medical History:   Diagnosis Date    Benign essential hypertension 6/21/2022    Class 3 severe obesity due to excess calories without serious comorbidity with body mass index (BMI) of 40.0 to 44.9 in adult (H) 5/13/2021    Scarlet fever     diagnosed with heart murmur       Past Surgical History:  Past Surgical History:   Procedure Laterality Date    COLONOSCOPY  9/23/22    1 benign polyp removed. Otherwise clear  "      Medications:  Current Outpatient Medications   Medication Sig Dispense Refill    Ascorbic Acid (SUPER C COMPLEX PO) Vit A, E, C and D3, zinc      fluticasone (FLONASE) 50 MCG/ACT nasal spray       losartan (COZAAR) 50 MG tablet Take 1 tablet (50 mg) by mouth daily 90 tablet 3    Multiple Vitamins-Minerals (MULTI-VITAMIN GUMMIES PO)       vitamin B-12 (CYANOCOBALAMIN) 2500 MCG sublingual tablet       cholecalciferol, vitamin D3, (VITAMIN D3 ORAL) [CHOLECALCIFEROL, VITAMIN D3, (VITAMIN D3 ORAL)] Take 1 tablet by mouth daily. (Patient not taking: Reported on 6/29/2023)         Allergies:  No Known Allergies     Social History:  Social History     Tobacco Use    Smoking status: Former     Packs/day: 0.50     Years: 1.50     Pack years: 0.75     Types: Cigarettes     Start date: 1/1/2011     Quit date: 9/1/2012     Years since quitting: 10.8    Smokeless tobacco: Never    Tobacco comments:     Stopped over a decade ago. No desire to return.   Vaping Use    Vaping Use: Never used   Substance Use Topics    Alcohol use: Not Currently     Comment: No alcohol since June 2022.    Drug use: Never       ROS: 10 point ROS neg other than the symptoms noted above in the HPI.    Physical Exam:    /78 (BP Location: Right arm, Patient Position: Sitting, Cuff Size: Adult Large)   Pulse 76   Ht 1.892 m (6' 2.5\")   Wt 123.8 kg (273 lb)   SpO2 97%   BMI 34.58 kg/m       Constitutional:  The patient was unaccompanied, well-groomed, and in no acute distress.     Neurologic: Alert and oriented x 3.  CN's III-XII within normal limits.  Voice normal.   Psychiatric: The patient's affect was calm, cooperative, and appropriate.    Communication:  Normal; communicates verbally, normal voice quality.   Respiratory: Breathing comfortably without stridor or exertion of accessory muscles.   Ears: Pinnae and tragus non-tender.  EAC's and TM's were clear.       Audiogram: 6/29/2023 - data independently reviewed  Normal hearing " bilaterally with exception of asymmetry of 25 dB at 8000 Hz with left worse than right  WR right: 96% left: 100% at 50 dB  Acoustic Reflexes: Present in all conditions  Tympanograms: type A bilaterally    Assessment and Plan:  1. Tinnitus, unspecified laterality  Patient with 3 month history of bilateral tinnitus with symptoms worse in the left than right.  Reviewed audiogram today with patient which shows normal hearing bilaterally with exception of an asymmetry at 8000 Hz with left worse than right.  This asymmetry could be contributing to tinnitus symptoms but I suspect that BP and other factors may have worsened severity of left sided symptoms. It is reassuring that MRI was negative for a retrocochlear lesion.      Explained to patient that tinnitus is a central process, meaning that it is a brain generated noise.  Reviewed other factors that can contribute to tinnitus including stress, anxiety, lack of sleep, and neck/muscle tension.     Explained to patient that there is no specific medication or procedure that can eliminate tinnitus, however, there are evidence-based management strategies that can be used to improve symptoms.  Discussed management strategies with patient including tinnitus masking and cognitive behavioral approaches.  Many people with tinnitus find improvement of symptoms with these management strategies but this can take some time and work from patient to adopt these strategies for management.      Patient is comfortable monitoring at this time.     Patient will follow up for repeat audiogram with any new changes in hearing or worsening of tinnitus symptoms.     Kathi Villa DNP, APRN, CNP  Otolaryngology  Head & Neck Surgery  [phone]    30 minutes spent by me on the date of the encounter doing chart review, history and exam, documentation and further activities per the note        Again, thank you for allowing me to participate in the care of your patient.      Sincerely,    Naida MARTINEZ  LISANDRO Villa

## 2023-06-29 NOTE — PROGRESS NOTES
AUDIOLOGY REPORT    SUMMARY: Audiology visit completed. See audiogram for results.      RECOMMENDATIONS: Follow-up with ENT.    Vince Montiel, CCC-A  Clinical Audiologist  [place] #[id number]

## 2023-07-06 NOTE — PROGRESS NOTES
Otolaryngology Clinic  June 29, 2023    Chief Complaint:   Left tinnitus      History of Present Illness:   Alex Land is a 47 year old male who presents today for evaluation of bilateral and left tinnitus.  Patient reports an onset of bilateral tinnitus after a flight in February 2023. Patient also reports an acute onset of left-sided tinnitus in March 2023.  Around the same time, patient does report an increase in blood pressure with an adjustment of blood pressure medications.  Patient states that tinnitus was very bothersome at that time affecting patient's ability to fall asleep and stay asleep at night.  Due to unilateral tinnitus and concern of new onset, patient had an MRI of the brain which was negative.  Since that time, has decreased he is back to his normal dose of medication.  The severity of tinnitus has also improved and it is barely noticeable at this time unless patient thinks about it or focuses on symptoms.    Patient denies any otalgia, otorrhea, dizziness, hearing loss, facial numbness/weakness, history of frequent ear infections, or ear surgeries.  Mother with hearing loss and wears hearing aids.      Past Medical History:  Past Medical History:   Diagnosis Date     Benign essential hypertension 6/21/2022     Class 3 severe obesity due to excess calories without serious comorbidity with body mass index (BMI) of 40.0 to 44.9 in adult (H) 5/13/2021     Scarlet fever     diagnosed with heart murmur       Past Surgical History:  Past Surgical History:   Procedure Laterality Date     COLONOSCOPY  9/23/22    1 benign polyp removed. Otherwise clear       Medications:  Current Outpatient Medications   Medication Sig Dispense Refill     Ascorbic Acid (SUPER C COMPLEX PO) Vit A, E, C and D3, zinc       fluticasone (FLONASE) 50 MCG/ACT nasal spray        losartan (COZAAR) 50 MG tablet Take 1 tablet (50 mg) by mouth daily 90 tablet 3     Multiple Vitamins-Minerals (MULTI-VITAMIN GUMMIES PO)         "vitamin B-12 (CYANOCOBALAMIN) 2500 MCG sublingual tablet        cholecalciferol, vitamin D3, (VITAMIN D3 ORAL) [CHOLECALCIFEROL, VITAMIN D3, (VITAMIN D3 ORAL)] Take 1 tablet by mouth daily. (Patient not taking: Reported on 6/29/2023)         Allergies:  No Known Allergies     Social History:  Social History     Tobacco Use     Smoking status: Former     Packs/day: 0.50     Years: 1.50     Pack years: 0.75     Types: Cigarettes     Start date: 1/1/2011     Quit date: 9/1/2012     Years since quitting: 10.8     Smokeless tobacco: Never     Tobacco comments:     Stopped over a decade ago. No desire to return.   Vaping Use     Vaping Use: Never used   Substance Use Topics     Alcohol use: Not Currently     Comment: No alcohol since June 2022.     Drug use: Never       ROS: 10 point ROS neg other than the symptoms noted above in the HPI.    Physical Exam:    /78 (BP Location: Right arm, Patient Position: Sitting, Cuff Size: Adult Large)   Pulse 76   Ht 1.892 m (6' 2.5\")   Wt 123.8 kg (273 lb)   SpO2 97%   BMI 34.58 kg/m       Constitutional:  The patient was unaccompanied, well-groomed, and in no acute distress.     Neurologic: Alert and oriented x 3.  CN's III-XII within normal limits.  Voice normal.   Psychiatric: The patient's affect was calm, cooperative, and appropriate.    Communication:  Normal; communicates verbally, normal voice quality.   Respiratory: Breathing comfortably without stridor or exertion of accessory muscles.   Ears: Pinnae and tragus non-tender.  EAC's and TM's were clear.       Audiogram: 6/29/2023 - data independently reviewed  Normal hearing bilaterally with exception of asymmetry of 25 dB at 8000 Hz with left worse than right  WR right: 96% left: 100% at 50 dB  Acoustic Reflexes: Present in all conditions  Tympanograms: type A bilaterally    Assessment and Plan:  1. Tinnitus, unspecified laterality  Patient with 3 month history of bilateral tinnitus with symptoms worse in the left " than right.  Reviewed audiogram today with patient which shows normal hearing bilaterally with exception of an asymmetry at 8000 Hz with left worse than right.  This asymmetry could be contributing to tinnitus symptoms but I suspect that BP and other factors may have worsened severity of left sided symptoms. It is reassuring that MRI was negative for a retrocochlear lesion.      Explained to patient that tinnitus is a central process, meaning that it is a brain generated noise.  Reviewed other factors that can contribute to tinnitus including stress, anxiety, lack of sleep, and neck/muscle tension.     Explained to patient that there is no specific medication or procedure that can eliminate tinnitus, however, there are evidence-based management strategies that can be used to improve symptoms.  Discussed management strategies with patient including tinnitus masking and cognitive behavioral approaches.  Many people with tinnitus find improvement of symptoms with these management strategies but this can take some time and work from patient to adopt these strategies for management.      Patient is comfortable monitoring at this time.     Patient will follow up for repeat audiogram with any new changes in hearing or worsening of tinnitus symptoms.     Kathi Villa DNP, APRN, CNP  Otolaryngology  Head & Neck Surgery  [phone]    30 minutes spent by me on the date of the encounter doing chart review, history and exam, documentation and further activities per the note

## 2024-04-12 DIAGNOSIS — I10 BENIGN ESSENTIAL HYPERTENSION: ICD-10-CM

## 2024-04-12 RX ORDER — LOSARTAN POTASSIUM 50 MG/1
50 TABLET ORAL DAILY
Qty: 90 TABLET | Refills: 0 | Status: SHIPPED | OUTPATIENT
Start: 2024-04-12 | End: 2024-06-25

## 2024-06-24 SDOH — HEALTH STABILITY: PHYSICAL HEALTH: ON AVERAGE, HOW MANY DAYS PER WEEK DO YOU ENGAGE IN MODERATE TO STRENUOUS EXERCISE (LIKE A BRISK WALK)?: 5 DAYS

## 2024-06-24 SDOH — HEALTH STABILITY: PHYSICAL HEALTH: ON AVERAGE, HOW MANY MINUTES DO YOU ENGAGE IN EXERCISE AT THIS LEVEL?: 50 MIN

## 2024-06-24 ASSESSMENT — ANXIETY QUESTIONNAIRES
8. IF YOU CHECKED OFF ANY PROBLEMS, HOW DIFFICULT HAVE THESE MADE IT FOR YOU TO DO YOUR WORK, TAKE CARE OF THINGS AT HOME, OR GET ALONG WITH OTHER PEOPLE?: SOMEWHAT DIFFICULT
5. BEING SO RESTLESS THAT IT IS HARD TO SIT STILL: NOT AT ALL
GAD7 TOTAL SCORE: 8
4. TROUBLE RELAXING: SEVERAL DAYS
GAD7 TOTAL SCORE: 8
3. WORRYING TOO MUCH ABOUT DIFFERENT THINGS: SEVERAL DAYS
IF YOU CHECKED OFF ANY PROBLEMS ON THIS QUESTIONNAIRE, HOW DIFFICULT HAVE THESE PROBLEMS MADE IT FOR YOU TO DO YOUR WORK, TAKE CARE OF THINGS AT HOME, OR GET ALONG WITH OTHER PEOPLE: SOMEWHAT DIFFICULT
6. BECOMING EASILY ANNOYED OR IRRITABLE: SEVERAL DAYS
2. NOT BEING ABLE TO STOP OR CONTROL WORRYING: SEVERAL DAYS
GAD7 TOTAL SCORE: 8
1. FEELING NERVOUS, ANXIOUS, OR ON EDGE: MORE THAN HALF THE DAYS
7. FEELING AFRAID AS IF SOMETHING AWFUL MIGHT HAPPEN: MORE THAN HALF THE DAYS
7. FEELING AFRAID AS IF SOMETHING AWFUL MIGHT HAPPEN: MORE THAN HALF THE DAYS

## 2024-06-24 ASSESSMENT — PATIENT HEALTH QUESTIONNAIRE - PHQ9
SUM OF ALL RESPONSES TO PHQ QUESTIONS 1-9: 5
SUM OF ALL RESPONSES TO PHQ QUESTIONS 1-9: 5
10. IF YOU CHECKED OFF ANY PROBLEMS, HOW DIFFICULT HAVE THESE PROBLEMS MADE IT FOR YOU TO DO YOUR WORK, TAKE CARE OF THINGS AT HOME, OR GET ALONG WITH OTHER PEOPLE: SOMEWHAT DIFFICULT

## 2024-06-24 ASSESSMENT — SOCIAL DETERMINANTS OF HEALTH (SDOH): HOW OFTEN DO YOU GET TOGETHER WITH FRIENDS OR RELATIVES?: MORE THAN THREE TIMES A WEEK

## 2024-06-25 ENCOUNTER — OFFICE VISIT (OUTPATIENT)
Dept: FAMILY MEDICINE | Facility: CLINIC | Age: 49
End: 2024-06-25
Attending: FAMILY MEDICINE
Payer: COMMERCIAL

## 2024-06-25 VITALS
DIASTOLIC BLOOD PRESSURE: 83 MMHG | BODY MASS INDEX: 31.63 KG/M2 | SYSTOLIC BLOOD PRESSURE: 121 MMHG | TEMPERATURE: 98.2 F | WEIGHT: 254.38 LBS | RESPIRATION RATE: 16 BRPM | OXYGEN SATURATION: 96 % | HEIGHT: 75 IN | HEART RATE: 66 BPM

## 2024-06-25 DIAGNOSIS — Z00.00 ANNUAL PHYSICAL EXAM: Primary | ICD-10-CM

## 2024-06-25 DIAGNOSIS — I10 BENIGN ESSENTIAL HYPERTENSION: ICD-10-CM

## 2024-06-25 DIAGNOSIS — Z13.1 SCREENING FOR DIABETES MELLITUS: ICD-10-CM

## 2024-06-25 DIAGNOSIS — Z13.0 SCREENING FOR DEFICIENCY ANEMIA: ICD-10-CM

## 2024-06-25 DIAGNOSIS — E78.5 DYSLIPIDEMIA: ICD-10-CM

## 2024-06-25 LAB
ERYTHROCYTE [DISTWIDTH] IN BLOOD BY AUTOMATED COUNT: 12.9 % (ref 10–15)
HBA1C MFR BLD: 5.4 % (ref 0–5.6)
HCT VFR BLD AUTO: 46.3 % (ref 40–53)
HGB BLD-MCNC: 15.5 G/DL (ref 13.3–17.7)
MCH RBC QN AUTO: 29.6 PG (ref 26.5–33)
MCHC RBC AUTO-ENTMCNC: 33.5 G/DL (ref 31.5–36.5)
MCV RBC AUTO: 89 FL (ref 78–100)
PLATELET # BLD AUTO: 292 10E3/UL (ref 150–450)
RBC # BLD AUTO: 5.23 10E6/UL (ref 4.4–5.9)
WBC # BLD AUTO: 8.6 10E3/UL (ref 4–11)

## 2024-06-25 PROCEDURE — 85027 COMPLETE CBC AUTOMATED: CPT | Performed by: FAMILY MEDICINE

## 2024-06-25 PROCEDURE — 99214 OFFICE O/P EST MOD 30 MIN: CPT | Mod: 25 | Performed by: FAMILY MEDICINE

## 2024-06-25 PROCEDURE — 83036 HEMOGLOBIN GLYCOSYLATED A1C: CPT | Performed by: FAMILY MEDICINE

## 2024-06-25 PROCEDURE — 99396 PREV VISIT EST AGE 40-64: CPT | Performed by: FAMILY MEDICINE

## 2024-06-25 PROCEDURE — 82607 VITAMIN B-12: CPT | Performed by: FAMILY MEDICINE

## 2024-06-25 PROCEDURE — 80053 COMPREHEN METABOLIC PANEL: CPT | Performed by: FAMILY MEDICINE

## 2024-06-25 PROCEDURE — 36415 COLL VENOUS BLD VENIPUNCTURE: CPT | Performed by: FAMILY MEDICINE

## 2024-06-25 PROCEDURE — 80061 LIPID PANEL: CPT | Performed by: FAMILY MEDICINE

## 2024-06-25 RX ORDER — ASPIRIN 81 MG/1
TABLET ORAL
COMMUNITY
Start: 2024-01-01

## 2024-06-25 RX ORDER — LOSARTAN POTASSIUM 50 MG/1
50 TABLET ORAL DAILY
Qty: 90 TABLET | Refills: 4 | Status: SHIPPED | OUTPATIENT
Start: 2024-06-25

## 2024-06-25 NOTE — PROGRESS NOTES
"Preventive Care Visit  [facility]  Tamara Adams DO, Family Medicine  Jun 25, 2024      Assessment & Plan     1. Annual physical exam  - PRIMARY CARE FOLLOW-UP SCHEDULING  - Adult Dermatology  Referral; Future    Reviewed health history and health maintenance recommendations.  Recommend dermatology visit for skin check due to numerous nevi.  Congratulated on impressive weight loss.  Continue healthy habits.    2. Benign essential hypertension  - losartan (COZAAR) 50 MG tablet; Take 1 tablet (50 mg) by mouth daily  Dispense: 90 tablet; Refill: 4  - Comprehensive metabolic panel (BMP + Alb, Alk Phos, ALT, AST, Total. Bili, TP); Future  - Home Blood Pressure Monitor Order for DME - ONLY FOR DME    Continue blood pressure medications.  Home cuff reading higher than clinic cuff, will continue current regimen as clinic readings look great.  Medication monitoring labs today.  Update home blood pressure cuff.  Consider scheduling nurse only visit to confirm accuracy of new cuff.    3. Dyslipidemia  - Lipid panel reflex to direct LDL Fasting; Future  - Comprehensive metabolic panel (BMP + Alb, Alk Phos, ALT, AST, Total. Bili, TP); Future    Last labs actually looked great, keep working on healthy habits.  ASCVD risk below treatment threshold.  No family history.    4. Screening for diabetes mellitus  - Hemoglobin A1c; Future    5. Screening for deficiency anemia  - Vitamin B12; Future  - CBC with platelets; Future      Patient has been advised of split billing requirements and indicates understanding: Yes        BMI  Estimated body mass index is 31.79 kg/m  as calculated from the following:    Height as of this encounter: 1.905 m (6' 3\").    Weight as of this encounter: 115.4 kg (254 lb 6 oz).   Weight management plan: Discussed healthy diet and exercise guidelines    Counseling  Appropriate preventive services were discussed with this patient, including applicable screening as " appropriate for fall prevention, nutrition, physical activity, Tobacco-use cessation, weight loss and cognition.  Checklist reviewing preventive services available has been given to the patient.  Reviewed patient's diet, addressing concerns and/or questions.   The patient's PHQ-9 score is consistent with mild depression. He was provided with information regarding depression.       Markus Ortiz is a 48 year old, presenting for the following:  Physical (Cardio and prostate/Fasting for labs.)        6/25/2024    10:25 AM   Additional Questions   Roomed by Regina CASTANEDA CMA   Accompanied by Self        Health Care Directive  Patient does not have a Health Care Directive or Living Will: Patient states has Advance Directive and will bring in a copy to clinic.    HPI    Annual physical exam  Body mass index is 31.79 kg/m .  Wt Readings from Last 4 Encounters:   06/25/24 115.4 kg (254 lb 6 oz)   06/29/23 123.8 kg (273 lb)   06/22/23 123.8 kg (273 lb)   04/14/23 128 kg (282 lb 2 oz)       Benign essential hypertension  BP Readings from Last 6 Encounters:   06/25/24 121/83   06/29/23 117/78   06/22/23 119/69   04/14/23 105/68   03/29/23 115/74   03/24/23 133/70     Last Comprehensive Metabolic Panel:  Lab Results   Component Value Date     06/22/2023    POTASSIUM 4.7 06/22/2023    CHLORIDE 105 06/22/2023    CO2 24 06/22/2023    ANIONGAP 11 06/22/2023     (H) 06/22/2023    BUN 13.9 06/22/2023    CR 0.93 06/22/2023    GFRESTIMATED >90 06/22/2023    KANA 9.4 06/22/2023       Dyslipidemia  The 10-year ASCVD risk score (Rambo JONES, et al., 2019) is: 3.9%    Values used to calculate the score:      Age: 48 years      Sex: Male      Is Non- : No      Diabetic: No      Tobacco smoker: No      Systolic Blood Pressure: 121 mmHg      Is BP treated: Yes      HDL Cholesterol: 30 mg/dL      Total Cholesterol: 174 mg/dL     Lab Results   Component Value Date    CHOL 174 06/22/2023     Lab Results    Component Value Date    HDL 30 06/22/2023     Lab Results   Component Value Date    LDL 78 06/22/2023     Lab Results   Component Value Date    TRIG 331 06/22/2023 6/24/2024   General Health   How would you rate your overall physical health? Good   Feel stress (tense, anxious, or unable to sleep) To some extent      (!) STRESS CONCERN      6/24/2024   Nutrition   Three or more servings of calcium each day? Yes   Diet: Regular (no restrictions)    Low salt   How many servings of fruit and vegetables per day? (!) 2-3   How many sweetened beverages each day? 0-1       Multiple values from one day are sorted in reverse-chronological order         6/24/2024   Exercise   Days per week of moderate/strenous exercise 5 days   Average minutes spent exercising at this level 50 min            6/24/2024   Social Factors   Frequency of gathering with friends or relatives More than three times a week   Worry food won't last until get money to buy more No   Food not last or not have enough money for food? No   Do you have housing? (Housing is defined as stable permanent housing and does not include staying ouside in a car, in a tent, in an abandoned building, in an overnight shelter, or couch-surfing.) Yes   Are you worried about losing your housing? No   Lack of transportation? No   Unable to get utilities (heat,electricity)? No            6/24/2024   Dental   Dentist two times every year? Yes            6/24/2024   TB Screening   Were you born outside of the [place]? No          Today's PHQ-9 Score:       6/24/2024    12:31 PM   PHQ-9 SCORE   PHQ-9 Total Score MyChart 5 (Mild depression)   PHQ-9 Total Score 5         6/24/2024   Substance Use   Alcohol more than 3/day or more than 7/wk No   Do you use any other substances recreationally? No        Social History     Tobacco Use    Smoking status: Former     Current packs/day: 0.00     Average packs/day: 0.5 packs/day for 1.7 years (0.8 ttl pk-yrs)     Types: Cigarettes  "    Start date: 2011     Quit date: 2012     Years since quittin.8    Smokeless tobacco: Never    Tobacco comments:     Stopped over a decade ago. No desire to return.   Vaping Use    Vaping status: Never Used   Substance Use Topics    Alcohol use: Not Currently     Comment: No alcohol since 2022.    Drug use: Never           2024   STI Screening   New sexual partner(s) since last STI/HIV test? No      ASCVD Risk   The 10-year ASCVD risk score (Rambo JONES, et al., 2019) is: 3.9%    Values used to calculate the score:      Age: 48 years      Sex: Male      Is Non- : No      Diabetic: No      Tobacco smoker: No      Systolic Blood Pressure: 121 mmHg      Is BP treated: Yes      HDL Cholesterol: 30 mg/dL      Total Cholesterol: 174 mg/dL        2024   Contraception/Family Planning   Questions about contraception or family planning No           Reviewed and updated as needed this visit by Provider   Tobacco  Allergies  Meds  Problems  Med Hx  Surg Hx  Fam Hx                Review of Systems  Constitutional, HEENT, cardiovascular, pulmonary, GI, , musculoskeletal, neuro, skin, endocrine and psych systems are negative, except as otherwise noted.     Objective    Exam  /83 (BP Location: Left arm, Patient Position: Sitting, Cuff Size: Adult Regular)   Pulse 66   Temp 98.2  F (36.8  C) (Oral)   Resp 16   Ht 1.905 m (6' 3\")   Wt 115.4 kg (254 lb 6 oz)   SpO2 96%   BMI 31.79 kg/m     Estimated body mass index is 31.79 kg/m  as calculated from the following:    Height as of this encounter: 1.905 m (6' 3\").    Weight as of this encounter: 115.4 kg (254 lb 6 oz).      Physical Exam    GENERAL: alert and no distress  EYES: Eyes grossly normal to inspection, PERRL and conjunctivae and sclerae normal  HENT: ear canals and TM's normal, nose and mouth without ulcers or lesions  NECK: no adenopathy, no asymmetry, masses, or scars  RESP: lungs clear to " auscultation - no rales, rhonchi or wheezes  CV: regular rate and rhythm, normal S1 S2, no S3 or S4, no murmur, click or rub, no peripheral edema  ABDOMEN: soft, nontender, no hepatosplenomegaly, no masses and bowel sounds normal  MS: no gross musculoskeletal defects noted, no edema  SKIN: no suspicious lesions or rashes  NEURO: Normal strength and tone, mentation intact and speech normal  PSYCH: mentation appears normal, affect normal/bright        Signed Electronically by: Tamara Adams, DO

## 2024-06-25 NOTE — PATIENT INSTRUCTIONS
"Patient Education   Preventive Care Advice   This is general advice we often give to help people stay healthy. Your care team may have specific advice just for you. Please talk to your care team about your own preventive care needs.  Lifestyle  Exercise at least 150 minutes each week (30 minutes a day, 5 days a week).  Do muscle strengthening activities 2 days a week. These help control your weight and prevent disease.  No smoking.  Wear sunscreen to prevent skin cancer.  Have your home tested for radon every 2 to 5 years. Radon is a colorless, odorless gas that can harm your lungs. To learn more, go to www.health.[facility] and search for \"Radon in Homes.\"  Keep guns unloaded and locked up in a safe place like a safe or gun vault, or, use a gun lock and hide the keys. Always lock away bullets separately. To learn more, visit Awesome.me.[place].gov and search for \"safe gun storage.\"  Nutrition  Eat 5 or more servings of fruits and vegetables each day.  Try wheat bread, brown rice and whole grain pasta (instead of white bread, rice, and pasta).  Get enough calcium and vitamin D. Check the label on foods and aim for 100% of the RDA (recommended daily allowance).  Regular exams  Have a dental exam and cleaning every 6 months.  See your health care team every year to talk about:  Any changes in your health.  Any medicines your care team has prescribed.  Preventive care, family planning, and ways to prevent chronic diseases.  Shots (vaccines)   HPV shots (up to age 26), if you've never had them before.  Hepatitis B shots (up to age 59), if you've never had them before.  COVID-19 shot: Get this shot when it's due.  Flu shot: Get a flu shot every year.  Tetanus shot: Get a tetanus shot every 10 years.  Pneumococcal, hepatitis A, and RSV shots: Ask your care team if you need these based on your risk.  Shingles shot (for age 50 and up).  General health tests  Diabetes screening:  Starting at age 35, Get screened for diabetes at least " every 3 years.  If you are younger than age 35, ask your care team if you should be screened for diabetes.  Cholesterol test: At age 39, start having a cholesterol test every 5 years, or more often if advised.  Bone density scan (DEXA): At age 50, ask your care team if you should have this scan for osteoporosis (brittle bones).  Hepatitis C: Get tested at least once in your life.  Abdominal aortic aneurysm screening: Talk to your doctor about having this screening if you:  Have ever smoked; and  Are biologically male; and  Are between the ages of 65 and 75.  STIs (sexually transmitted infections)  Before age 24: Ask your care team if you should be screened for STIs.  After age 24: Get screened for STIs if you're at risk. You are at risk for STIs (including HIV) if:  You are sexually active with more than one person.  You don't use condoms every time.  You or a partner was diagnosed with a sexually transmitted infection.  If you are at risk for HIV, ask about PrEP medicine to prevent HIV.  Get tested for HIV at least once in your life, whether you are at risk for HIV or not.  Cancer screening tests  Cervical cancer screening: If you have a cervix, begin getting regular cervical cancer screening tests at age 21. Most people who have regular screenings with normal results can stop after age 65. Talk about this with your provider.  Breast cancer scan (mammogram): If you've ever had breasts, begin having regular mammograms starting at age 40. This is a scan to check for breast cancer.  Colon cancer screening: It is important to start screening for colon cancer at age 45.  Have a colonoscopy test every 10 years (or more often if you're at risk) Or, ask your provider about stool tests like a FIT test every year or Cologuard test every 3 years.  To learn more about your testing options, visit: www.[employer]/556850.pdf.  For help making a decision, visit: rudy/pz12385.  Prostate cancer screening test: If you have a  prostate and are age 55 to 69, ask your provider if you would benefit from a yearly prostate cancer screening test.  Lung cancer screening: If you are a current or former smoker age 50 to 80, ask your care team if ongoing lung cancer screenings are right for you.  For informational purposes only. Not to replace the advice of your health care provider. Copyright   2023 [facility]. All rights reserved. Clinically reviewed by the [facility]. Critic[employer]ia.com 106188 - REV 04/24.  Learning About Depression Screening  What is depression screening?  Depression screening is a way to see if you have depression symptoms. It may be done by a doctor or counselor. It's often part of a routine checkup. That's because your mental health is just as important as your physical health.  Depression is a mental health condition that affects how you feel, think, and act. You may:  Have less energy.  Lose interest in your daily activities.  Feel sad and grouchy for a long time.  Depression is very common. It affects people of all ages.  Many things can lead to depression. Some people become depressed after they have a stroke or find out they have a major illness like cancer or heart disease. The death of a loved one or a breakup may lead to depression. It can run in families. Most experts believe that a combination of inherited genes and stressful life events can cause it.  What happens during screening?  You may be asked to fill out a form about your depression symptoms. You and the doctor will discuss your answers. The doctor may ask you more questions to learn more about how you think, act, and feel.  What happens after screening?  If you have symptoms of depression, your doctor will talk to you about your options.  Doctors usually treat depression with medicines or counseling. Often, combining the two works best. Many people don't get help because they think that they'll get over the depression on  "their own. But people with depression may not get better unless they get treatment.  The cause of depression is not well understood. There may be many factors involved. But if you have depression, it's not your fault.  A serious symptom of depression is thinking about death or suicide. If you or someone you care about talks about this or about feeling hopeless, get help right away.  It's important to know that depression can be treated. Medicine, counseling, and self-care may help.  Where can you learn more?  Go to https://www.PlayFirst.net/patiented  Enter T185 in the search box to learn more about \"Learning About Depression Screening.\"  Current as of: June 24, 2023               Content Version: 14.0    9799-3971 [employer].   Care instructions adapted under license by your healthcare professional. If you have questions about a medical condition or this instruction, always ask your healthcare professional. [employer], [employer] disclaims any warranty or liability for your use of this information.         "

## 2024-06-26 LAB
ALBUMIN SERPL BCG-MCNC: 4.6 G/DL (ref 3.5–5.2)
ALP SERPL-CCNC: 66 U/L (ref 40–150)
ALT SERPL W P-5'-P-CCNC: 14 U/L (ref 0–70)
ANION GAP SERPL CALCULATED.3IONS-SCNC: 11 MMOL/L (ref 7–15)
AST SERPL W P-5'-P-CCNC: 14 U/L (ref 0–45)
BILIRUB SERPL-MCNC: 0.7 MG/DL
BUN SERPL-MCNC: 10 MG/DL (ref 6–20)
CALCIUM SERPL-MCNC: 9.3 MG/DL (ref 8.6–10)
CHLORIDE SERPL-SCNC: 105 MMOL/L (ref 98–107)
CHOLEST SERPL-MCNC: 163 MG/DL
CREAT SERPL-MCNC: 0.88 MG/DL (ref 0.67–1.17)
DEPRECATED HCO3 PLAS-SCNC: 24 MMOL/L (ref 22–29)
EGFRCR SERPLBLD CKD-EPI 2021: >90 ML/MIN/1.73M2
FASTING STATUS PATIENT QL REPORTED: YES
FASTING STATUS PATIENT QL REPORTED: YES
GLUCOSE SERPL-MCNC: 91 MG/DL (ref 70–99)
HDLC SERPL-MCNC: 40 MG/DL
LDLC SERPL CALC-MCNC: 95 MG/DL
NONHDLC SERPL-MCNC: 123 MG/DL
POTASSIUM SERPL-SCNC: 4.3 MMOL/L (ref 3.4–5.3)
PROT SERPL-MCNC: 7.3 G/DL (ref 6.4–8.3)
SODIUM SERPL-SCNC: 140 MMOL/L (ref 135–145)
TRIGL SERPL-MCNC: 142 MG/DL
VIT B12 SERPL-MCNC: 1417 PG/ML (ref 232–1245)

## 2024-06-26 NOTE — RESULT ENCOUNTER NOTE
Patient updated by [facility] message with lab results.      Wan Ortiz,  Labs look great.  Please continue with current treatment plan.  Reach out by [facility] with any follow-up questions or concerns.  Tamara Adams, DO

## 2024-07-08 ENCOUNTER — TELEPHONE (OUTPATIENT)
Dept: SLEEP MEDICINE | Facility: CLINIC | Age: 49
End: 2024-07-08
Payer: COMMERCIAL

## 2024-07-08 NOTE — TELEPHONE ENCOUNTER
Called  patient and LVM for him to call back to schedule  office visit. Requesting cpap supplies, but was last seen 2/10/22 with Song. Need to estab care with sleep provider for cpap supplies.    Giovanni Kraus, Central Scheduler

## 2024-07-24 ENCOUNTER — ALLIED HEALTH/NURSE VISIT (OUTPATIENT)
Dept: FAMILY MEDICINE | Facility: CLINIC | Age: 49
End: 2024-07-24
Payer: COMMERCIAL

## 2024-07-24 VITALS — SYSTOLIC BLOOD PRESSURE: 107 MMHG | HEART RATE: 71 BPM | DIASTOLIC BLOOD PRESSURE: 76 MMHG

## 2024-07-24 DIAGNOSIS — I10 BENIGN ESSENTIAL HYPERTENSION: Primary | ICD-10-CM

## 2024-07-24 PROCEDURE — 99207 PR NO CHARGE NURSE ONLY: CPT

## 2024-07-24 NOTE — PROGRESS NOTES
I met with Alex Land at the request of Dr. Adams to recheck his blood pressure.  Blood pressure medications on the med list were reviewed with patient.    Patient has taken all medications as per usual regimen: Yes  Patient reports tolerating them without any issues or concerns: Yes    Vitals:    07/24/24 1039   BP: 107/76   Pulse: 71   Home cuff -  121/78 - pulse 72    Blood pressure was taken, previous encounter was reviewed, recorded blood pressure below 140/90.  Patient was discharged and the note will be sent to the provider for final review.

## 2024-08-18 NOTE — PROGRESS NOTES
"Alex Land is a 48 year old male who is being evaluated via a billable video visit.       The patient has been notified of following:      \"This video visit will be conducted via a call between you and your physician/provider. We have found that certain health care needs can be provided without the need for an in-person physical exam.  This service lets us provide the care you need with a video conversation.  If a prescription is necessary we can send it directly to your pharmacy.  If lab work is needed we can place an order for that and you can then stop by our lab to have the test done at a later time.     Video visits are billed at different rates depending on your insurance coverage.  Please reach out to your insurance provider with any questions.     If during the course of the call the physician/provider feels a video visit is not appropriate, you will not be charged for this service.\"     Patient has given verbal consent for Video visit? Yes  How would you like to obtain your AVS? Mail a copy  If you are dropped from the video visit, the video invite should be resent to: Text to cell phone: -  Will anyone else be joining your video visit? No  If patient encounters technical issues they should call [phone]      Video-Visit Details     Type of service:  Video Visit     Start Time:  0930  End Time:  0945    Originating Location (pt. Location): Home     Distant Location (provider location):  Off-site, [facility]       Platform used for Video Visit: [employer]    Virtual visit for annual follow-up for severe obstructive sleep apnea managed with CPAP.     Assessment:  - Severe LAUREN with sleep-associated hypoxemia     Plan:  - Appears well controlled with excellent compliance on CPAP auto-titrate 5-15 cm H2O.  - Follow- up in 1 year    The longitudinal plan of care for the diagnosis(es)/condition(s) as documented were addressed during this visit. Due to the added complexity in care, " I will continue to support Angel in the subsequent management and with ongoing continuity of care.      SUBJECTIVE:  Alex Land is a 48 year old male.    Prior sleep testin2021 - HST with weight 327 lbs, BMI 41.5.  pAHI 95.3.  Mean SpO2 88%, wally SpO2 60%, Time with saturation less than 88% was 212.8 minutes.  SpO2 did appear to normalize between very frequent events.     7/15/2021 -his concerns are a 3-4-year history of increasing nighttime awakenings and a 10+ year history of daytime fatigue.  He also has been told by friends and family that he can snore very loudly and appear to have prolonged breathing pauses in his sleep.     He recalls having what sounds like an in lab PSG in approximately .  I did not have results to review from this, he does not recall being told that he had sleep apnea and he is unsure how his weight compared then until now.     While he does endorse daytime fatigue, he denies inappropriate daytime sleepiness and no concerns with driving.     No known family history of sleep disordered breathing.     Social history:  He currently resigned from his job working for [employer] for [employer] for the last 4 years.  He is using this as a sabbatical with a goal to focus on his health.     A/P for watchPAT HST.     2021 - We reviewed his home sleep test results in detail.  A/P to start treatment with CPAP 5-15.     2/10/2022 - Overall, he feels he is doing very well with his CPAP.  Feels well rested, resolution of daytime fatigue.  No specific concerns.     CPAP download reviewed over past 30 days on auto-titrate 5-15 cm H2O.  Average daily usage 247 minutes.  AHI 2.8.    A/P to continue CPAP auto 5-15, recommended nightly usage of 7 hours or more.    Today -he continues to do incredibly well with his CPAP.  He has no concerns today, he predominately needed a visit for updated CPAP supply prescription.  His current mask interface is a ResMed AirFit N 30I, size small.    CPAP  download reviewed over past 30 days on auto-titrate 5-15 cm H2O.  Used 30/30 days.  Average usage 456 minutes.  AHI 0.4.    Past medical history:    Patient Active Problem List    Diagnosis Date Noted    Tinnitus, left 04/14/2023     Priority: Medium    Benign neoplasm of transverse colon 09/27/2022     Priority: Medium    Polyp of colon 09/23/2022     Priority: Medium    Benign essential hypertension 06/21/2022     Priority: Medium    Halo nevus 06/21/2022     Priority: Medium    Skin tag 06/21/2022     Priority: Medium    LAUREN (obstructive sleep apnea) - on CPAP 08/20/2021     Priority: Medium    Impaired fasting glucose 05/15/2021     Priority: Medium    Dyslipidemia 05/15/2021     Priority: Medium    Vitamin D deficiency 05/15/2021     Priority: Medium    Hx malalignment of kneecaps bilaterally 05/13/2021     Priority: Medium    ADHD, Predominantly Inattentive Type      Priority: Medium     Created by Conversion  Replacement Utility updated for latest IMO load           10 point ROS of systems including Constitutional, Eyes, Respiratory, Cardiovascular, Gastroenterology, Genitourinary, Integumentary, Muscularskeletal, Psychiatric were all negative except for pertinent positives noted in my HPI.    Current Outpatient Medications   Medication Sig Dispense Refill    Ascorbic Acid (SUPER C COMPLEX PO) Vit A, E, C and D3, zinc      aspirin 81 MG EC tablet       fluticasone (FLONASE) 50 MCG/ACT nasal spray       losartan (COZAAR) 50 MG tablet Take 1 tablet (50 mg) by mouth daily 90 tablet 4    vitamin B-12 (CYANOCOBALAMIN) 2500 MCG sublingual tablet          OBJECTIVE:  There were no vitals taken for this visit.    Physical Exam     ---  This note was written with the assistance of the Dragon voice-dictation technology software. The final document, although reviewed, may contain errors. For corrections, please contact the office.    Total time spent preparing to see the patient, review of chart, obtaining history and  physical examination, review of sleep testing, review of treatment options, education, discussion with patient and documenting in Epic / EMR was 20 minutes.  All time involved was spent on the day of service for the patient (the same day as the patient's appointment).    Dakota Zuleta MD    Sleep Medicine  [place], [place]  Main Office: [phone]  [facility] [address], [address]  Schedule visits: [phone]  Main Office: [phone]  Fax: [phone]

## 2024-08-19 ENCOUNTER — VIRTUAL VISIT (OUTPATIENT)
Dept: PULMONOLOGY | Facility: OTHER | Age: 49
End: 2024-08-19
Attending: FAMILY MEDICINE
Payer: COMMERCIAL

## 2024-08-19 VITALS — WEIGHT: 265 LBS | HEIGHT: 75 IN | BODY MASS INDEX: 32.95 KG/M2

## 2024-08-19 DIAGNOSIS — G47.33 OSA (OBSTRUCTIVE SLEEP APNEA): Primary | ICD-10-CM

## 2024-08-19 PROCEDURE — G2211 COMPLEX E/M VISIT ADD ON: HCPCS | Mod: 95 | Performed by: FAMILY MEDICINE

## 2024-08-19 PROCEDURE — 99213 OFFICE O/P EST LOW 20 MIN: CPT | Mod: 95 | Performed by: FAMILY MEDICINE

## 2024-08-19 NOTE — NURSING NOTE
Current patient location: [place]  [facility] [address]    Is the patient currently in the state of [place]? YES    Visit mode:Video     If the visit is dropped, the patient can be reconnected by: VIDEO VISIT: Text to cell phone:   Telephone Information:   Mobile [phone]       Will anyone else be joining the visit? NO  (If patient encounters technical issues they should call [phone])    How would you like to obtain your AVS? MyChart    Are changes needed to the allergy or medication list? Pt stated no med changes    Are refills needed on medications prescribed by this physician? NO    Reason for visit: RECHECK    Carmen COON

## 2024-08-19 NOTE — PROGRESS NOTES
"Virtual Visit Details    Type of service:  Video Visit     Originating Location (pt. Location): {video visit patient location:457781::\"Home\"}  {PROVIDER LOCATION On-site should be selected for visits conducted from your clinic location or adjoining [facility] hospital, academic office, or other nearby [facility] building. Off-site should be selected for all other provider locations, including home:865683}  Distant Location (provider location):  {virtual location provider:109356}  Platform used for Video Visit: {Virtual Visit Platforms:665608::\"[employer]\"}                CPAP Compliance Visit:       Name: Alex Land MRN# [id number]   Age: 48 year old YOB: 1975     Date of Consultation: August 19, 2024  Primary care provider: Tamara Adams    Compliance:   100 % of days with >4 hours of use.   0days/30 with no use   Average use: 7hours 35minutes per day   95%ile leak 11.2 L/min   CPAP 95% pressure 9.3 cm   AHI 0.4 events/hour   JIL 0.1  PB 0%     Impression:   Patient is {CPAP Compliance :088965}    "

## 2025-03-17 ENCOUNTER — E-VISIT (OUTPATIENT)
Dept: FAMILY MEDICINE | Facility: CLINIC | Age: 50
End: 2025-03-17
Payer: COMMERCIAL

## 2025-03-17 DIAGNOSIS — R51.9 NONINTRACTABLE HEADACHE, UNSPECIFIED CHRONICITY PATTERN, UNSPECIFIED HEADACHE TYPE: Primary | ICD-10-CM

## 2025-03-17 PROCEDURE — 99207 PR NON-BILLABLE SERV PER CHARTING: CPT | Performed by: FAMILY MEDICINE

## 2025-03-18 ENCOUNTER — NURSE TRIAGE (OUTPATIENT)
Dept: FAMILY MEDICINE | Facility: CLINIC | Age: 50
End: 2025-03-18
Payer: COMMERCIAL

## 2025-03-18 NOTE — TELEPHONE ENCOUNTER
ALTAGRACIA TEAM - Please triage headaches.     ---------------------------      Assessment:  symptoms started around 2 weeks ago.  Patient describes the pain as a quick spike or dull throb.  And the pain is located behind left eye. Pain lasts anywhere from a few seconds to a couple minutes.  The symptoms are intermittent and occur at varying frequency.  Anywhere from a couple times an hour to a couple times a day.  Pain rating over the weekend was 1/10 and yesterday was 4/10.  Patient reports that symptoms are much better this morning he has had 1-2 episodes that have lasted less than 5 seconds.  Patient does not report any vision changes but does endorse some increased eyestrain lately.  He states that he has been using his computer more frequently and at night he has a habit of closing his right eye and staring at his phone with his left eye.  Patient has been using Tylenol and Advil, however he states because symptoms are so intermittent it is hard to tell if the medication helps.     -------------------------------    Provider E-Visit time total (minutes): n/a - recommend in person visit

## 2025-03-18 NOTE — TELEPHONE ENCOUNTER
"Nurse Triage SBAR    Is this a 2nd Level Triage? YES, LICENSED PRACTITIONER REVIEW IS REQUIRED    Situation:  headaches that are described as \"quick spikes/dull throb\"    Background:   Past Medical History:   Diagnosis Date    Benign essential hypertension 6/21/2022    Class 3 severe obesity due to excess calories without serious comorbidity with body mass index (BMI) of 40.0 to 44.9 in adult (H) 5/13/2021    Scarlet fever     diagnosed with heart murmur         Assessment:  symptoms started around 2 weeks ago.  Patient describes the pain as a quick spike or dull throb.  And the pain is located behind left eye. Pain lasts anywhere from a few seconds to a couple minutes.  The symptoms are intermittent and occur at varying frequency.  Anywhere from a couple times an hour to a couple times a day.  Pain rating over the weekend was 1/10 and yesterday was 4/10.  Patient reports that symptoms are much better this morning he has had 1-2 episodes that have lasted less than 5 seconds.  Patient does not report any vision changes but does endorse some increased eyestrain lately.  He states that he has been using his computer more frequently and at night he has a habit of closing his right eye and staring at his phone with his left eye.  Patient has been using Tylenol and Advil, however he states because symptoms are so intermittent it is hard to tell if the medication helps.    Protocol Recommended Disposition:   Home Care    Recommendation:  provided patient with ice in regards to tension headache and reducing eyestrain.  Patient verbalized understanding.  He will call us if symptoms worsen or do not resolve    Routed to provider    Does the patient meet one of the following criteria for ADS visit consideration? 16+ years old, with an [facility] PCP     TIP  Providers, please consider if this condition is appropriate for management at one of our Acute and Diagnostic Services sites.     If patient is a good candidate, please use " dotphrase <dot>triageresponse and select Refer to ADS to document.     Reason for Disposition   MILD to MODERATE headache    Protocols used: Headache-A-OH

## 2025-03-18 NOTE — TELEPHONE ENCOUNTER
Appreciate additional information to help evaluate e-visit.  Recommend in person evaluation.  Patient updated by e-visit.  Tamara Adams, DO

## 2025-03-18 NOTE — PATIENT INSTRUCTIONS
Thank you for choosing us for your care. I think an in-clinic or virtual visit would be the best next step based on your symptoms. Please schedule a clinic appointment; you won t be charged for this eVisit.      You can schedule an appointment by clicking here in Storefront, or call [phone].

## 2025-03-24 ENCOUNTER — OFFICE VISIT (OUTPATIENT)
Dept: FAMILY MEDICINE | Facility: CLINIC | Age: 50
End: 2025-03-24
Payer: COMMERCIAL

## 2025-03-24 VITALS
OXYGEN SATURATION: 96 % | TEMPERATURE: 98.7 F | SYSTOLIC BLOOD PRESSURE: 134 MMHG | DIASTOLIC BLOOD PRESSURE: 89 MMHG | BODY MASS INDEX: 37.13 KG/M2 | RESPIRATION RATE: 16 BRPM | HEART RATE: 72 BPM | WEIGHT: 298.6 LBS | HEIGHT: 75 IN

## 2025-03-24 DIAGNOSIS — R51.9 NONINTRACTABLE EPISODIC HEADACHE, UNSPECIFIED HEADACHE TYPE: Primary | ICD-10-CM

## 2025-03-24 PROCEDURE — 3079F DIAST BP 80-89 MM HG: CPT | Performed by: FAMILY MEDICINE

## 2025-03-24 PROCEDURE — 3075F SYST BP GE 130 - 139MM HG: CPT | Performed by: FAMILY MEDICINE

## 2025-03-24 PROCEDURE — 99213 OFFICE O/P EST LOW 20 MIN: CPT | Performed by: FAMILY MEDICINE

## 2025-03-24 NOTE — PROGRESS NOTES
"Assessment/ Plan     1. Nonintractable episodic headache, unspecified headache type (Primary)  Angel comes in today for follow-up of a left sided headache that he was having on and off for several weeks.  This is now resolved and has not recurred over the last 5 days.  It likely was a viral sinus infection versus migraine variant.  If this should happen again, would recommend restarting the Flonase as a starting treatment.      Subjective:      Alex Land is a 49 year old male who presents for follow-up of headaches.  He had been messaging back-and-forth with his primary care provider last week.  He was having left-sided headaches around his left eye and some tooth pain.  It had been on and off for about 10 to 12 days.  It would come and go sometimes just for couple of seconds.  Now it has been resolved since last week.  He thought he should just come in to make sure everything is okay.  He is not having a fever.  He was having some left-sided upper tooth pain when it was happening but that has resolved as well.  He just has a little bit of residual sinus pressure occasionally.  No sore throat or ear pain.  No cough.    Relevant past medical, family, surgical, and social history reviewed with patient, unless noted in HPI, not pertinent for this visit.  Medications were discussed and reconciled.   Review of Systems   A 12 point comprehensive review of systems was negative except as noted.      Current Outpatient Medications   Medication Sig Dispense Refill    Ascorbic Acid (SUPER C COMPLEX PO) Vit A, E, C and D3, zinc      aspirin 81 MG EC tablet       fluticasone (FLONASE) 50 MCG/ACT nasal spray       losartan (COZAAR) 50 MG tablet Take 1 tablet (50 mg) by mouth daily 90 tablet 4    vitamin B-12 (CYANOCOBALAMIN) 2500 MCG sublingual tablet            Objective:     /89   Pulse 72   Temp 98.7  F (37.1  C)   Resp 16   Ht 1.905 m (6' 3\")   Wt 135.4 kg (298 lb 9.6 oz)   SpO2 96%   BMI 37.32 kg/m  "     Body mass index is 37.32 kg/m .       General appearance: alert, appears stated age and cooperative  Head: Normocephalic, without obvious abnormality, atraumatic  Eyes: conjunctivae/corneas clear. Ears: normal TM's and external ear canals both ears  Nose:  No drainage or sinus tenderness.  Throat: lips, mucosa, and tongue normal; teeth and gums normal  Neck: no adenopathy  Lungs: clear to auscultation bilaterally  Heart: regular rate and rhythm, S1, S2 normal, no murmur, click, rub or gallop           No results found for this or any previous visit (from the past week).       This note has been dictated using voice recognition software. Any grammatical or context distortions are unintentional and inherent to the software

## 2025-06-26 ENCOUNTER — OFFICE VISIT (OUTPATIENT)
Dept: FAMILY MEDICINE | Facility: CLINIC | Age: 50
End: 2025-06-26
Attending: FAMILY MEDICINE
Payer: COMMERCIAL

## 2025-06-26 VITALS
DIASTOLIC BLOOD PRESSURE: 87 MMHG | HEART RATE: 65 BPM | WEIGHT: 306 LBS | TEMPERATURE: 98.7 F | RESPIRATION RATE: 14 BRPM | HEIGHT: 75 IN | SYSTOLIC BLOOD PRESSURE: 136 MMHG | BODY MASS INDEX: 38.05 KG/M2 | OXYGEN SATURATION: 97 %

## 2025-06-26 DIAGNOSIS — Z13.1 SCREENING FOR DIABETES MELLITUS: ICD-10-CM

## 2025-06-26 DIAGNOSIS — Z13.220 LIPID SCREENING: ICD-10-CM

## 2025-06-26 DIAGNOSIS — E66.01 MORBID OBESITY (H): ICD-10-CM

## 2025-06-26 DIAGNOSIS — Z00.00 ANNUAL PHYSICAL EXAM: Primary | ICD-10-CM

## 2025-06-26 DIAGNOSIS — Z23 IMMUNIZATION DUE: ICD-10-CM

## 2025-06-26 DIAGNOSIS — I10 BENIGN ESSENTIAL HYPERTENSION: ICD-10-CM

## 2025-06-26 PROBLEM — D12.3 BENIGN NEOPLASM OF TRANSVERSE COLON: Status: RESOLVED | Noted: 2022-09-27 | Resolved: 2025-06-26

## 2025-06-26 LAB
EST. AVERAGE GLUCOSE BLD GHB EST-MCNC: 108 MG/DL
HBA1C MFR BLD: 5.4 % (ref 0–5.6)

## 2025-06-26 RX ORDER — LOSARTAN POTASSIUM 50 MG/1
50 TABLET ORAL DAILY
Qty: 90 TABLET | Refills: 4 | Status: SHIPPED | OUTPATIENT
Start: 2025-06-26

## 2025-06-26 SDOH — HEALTH STABILITY: PHYSICAL HEALTH: ON AVERAGE, HOW MANY DAYS PER WEEK DO YOU ENGAGE IN MODERATE TO STRENUOUS EXERCISE (LIKE A BRISK WALK)?: 4 DAYS

## 2025-06-26 ASSESSMENT — SOCIAL DETERMINANTS OF HEALTH (SDOH): HOW OFTEN DO YOU GET TOGETHER WITH FRIENDS OR RELATIVES?: MORE THAN THREE TIMES A WEEK

## 2025-06-26 NOTE — PATIENT INSTRUCTIONS
Patient Education   Preventive Care Advice   This is general advice given by our system to help you stay healthy. However, your care team may have specific advice just for you. Please talk to your care team about your preventive care needs.  Nutrition  Eat 5 or more servings of fruits and vegetables each day.  Try wheat bread, brown rice and whole grain pasta (instead of white bread, rice, and pasta).  Get enough calcium and vitamin D. Check the label on foods and aim for 100% of the RDA (recommended daily allowance).  Lifestyle  Exercise at least 150 minutes each week  (30 minutes a day, 5 days a week).  Do muscle strengthening activities 2 days a week. These help control your weight and prevent disease.  No smoking.  Wear sunscreen to prevent skin cancer.  Have a dental exam and cleaning every 6 months.  Yearly exams  See your health care team every year to talk about:  Any changes in your health.  Any medicines your care team has prescribed.  Preventive care, family planning, and ways to prevent chronic diseases.  Shots (vaccines)   HPV shots (up to age 26), if you've never had them before.  Hepatitis B shots (up to age 59), if you've never had them before.  COVID-19 shot: Get this shot when it's due.  Flu shot: Get a flu shot every year.  Tetanus shot: Get a tetanus shot every 10 years.  Pneumococcal, hepatitis A, and RSV shots: Ask your care team if you need these based on your risk.  Shingles shot (for age 50 and up)  General health tests  Diabetes screening:  Starting at age 35, Get screened for diabetes at least every 3 years.  If you are younger than age 35, ask your care team if you should be screened for diabetes.  Cholesterol test: At age 39, start having a cholesterol test every 5 years, or more often if advised.  Bone density scan (DEXA): At age 50, ask your care team if you should have this scan for osteoporosis (brittle bones).  Hepatitis C: Get tested at least once in your life.  STIs (sexually  transmitted infections)  Before age 24: Ask your care team if you should be screened for STIs.  After age 24: Get screened for STIs if you're at risk. You are at risk for STIs (including HIV) if:  You are sexually active with more than one person.  You don't use condoms every time.  You or a partner was diagnosed with a sexually transmitted infection.  If you are at risk for HIV, ask about PrEP medicine to prevent HIV.  Get tested for HIV at least once in your life, whether you are at risk for HIV or not.  Cancer screening tests  Cervical cancer screening: If you have a cervix, begin getting regular cervical cancer screening tests starting at age 21.  Breast cancer scan (mammogram): If you've ever had breasts, begin having regular mammograms starting at age 40. This is a scan to check for breast cancer.  Colon cancer screening: It is important to start screening for colon cancer at age 45.  Have a colonoscopy test every 10 years (or more often if you're at risk) Or, ask your provider about stool tests like a FIT test every year or Cologuard test every 3 years.  To learn more about your testing options, visit:   .  For help making a decision, visit:   https://bit.ly/nu91017.  Prostate cancer screening test: If you have a prostate, ask your care team if a prostate cancer screening test (PSA) at age 55 is right for you.  Lung cancer screening: If you are a current or former smoker ages 50 to 80, ask your care team if ongoing lung cancer screenings are right for you.  For informational purposes only. Not to replace the advice of your health care provider. Copyright   2023 [employer]. All rights reserved. Clinically reviewed by the [facility]. [employer] 863173 - REV 01/24.

## 2025-06-26 NOTE — PROGRESS NOTES
Preventive Care Visit  [facility]  Tamara Adams DO, Family Medicine  Jun 26, 2025    Assessment & Plan     1. Annual physical exam (Primary)  - Adult Dermatology  Referral; Future    Reviewed health history and health maintenance recommendation.  Recommend formal skin check with dermatology. New order signed.       2. Morbid obesity (H)  - Comprehensive metabolic panel (BMP + Alb, Alk Phos, ALT, AST, Total. Bili, TP); Future  - Lipid panel reflex to direct LDL Fasting; Future  - Hemoglobin A1c; Future  - TSH with free T4 reflex; Future    Meets criteria for morbid obesity with BMI greater than 35 (38) with comorbidities including hypertension.  Had successfully lost weight in the past, has again gained weight.   Trying to reprioritize lifestyle modifications.  WE reviewed medication options. I think he would be a great candidate for GLP-1, discussed alternatively Topamax vs Contrave components. Avoid phentermine with hypertension. Consider working with comprehensive weight management team.    He will think about options further and check with insurance plan. [facility] if he decides he would like a referral or to initiate medication management.     3. Benign essential hypertension  - losartan (COZAAR) 50 MG tablet; Take 1 tablet (50 mg) by mouth daily.  Dispense: 90 tablet; Refill: 4  - Comprehensive metabolic panel (BMP + Alb, Alk Phos, ALT, AST, Total. Bili, TP); Future    Blood pressure adequately controlled, home readings at goal as well, continue losartan and update medication monitoring labs.    4. Lipid screening  - Comprehensive metabolic panel (BMP + Alb, Alk Phos, ALT, AST, Total. Bili, TP); Future  - Lipid panel reflex to direct LDL Fasting; Future    ASCVD risk previously below treatment threshold.  Continue working on healthy habits.  Update medication monitoring labs.    5. Screening for diabetes mellitus  - Comprehensive metabolic panel (BMP +  "Alb, Alk Phos, ALT, AST, Total. Bili, TP); Future  - Hemoglobin A1c; Future    6. Immunization due  - COVID-19 12+ (PFIZER)        Patient has been advised of split billing requirements and indicates understanding: Yes        BMI  Estimated body mass index is 38.25 kg/m  as calculated from the following:    Height as of this encounter: 1.905 m (6' 3\").    Weight as of this encounter: 138.8 kg (306 lb).   Weight management plan: Discussed healthy diet and exercise guidelines  Reviewed preventive health counseling, as reflected in patient instructions  Counseling  Appropriate preventive services were addressed with this patient via screening, questionnaire, or discussion as appropriate for fall prevention, nutrition, physical activity, Tobacco-use cessation, social engagement, weight loss and cognition.  Checklist reviewing preventive services available has been given to the patient.  Reviewed patient's diet, addressing concerns and/or questions.       The longitudinal plan of care for the diagnosis(es)/condition(s) as documented were addressed during this visit. Due to the added complexity in care, I will continue to support Angel in the subsequent management and with ongoing continuity of care.      Markus Ortiz is a 49 year old, presenting for the following:  Physical        6/26/2025    10:24 AM   Additional Questions   Roomed by Samy MENDEZ    Annual physical exam (Primary)  - Covid: will do today      - Colon: September 2022 - repeat 7 years      - eye: will schedule follow up - needs new contacts    - teeth: twice a year    - skin: has not seen derm / no skin changes.     The 10-year ASCVD risk score (Rambo JONES, et al., 2019) is: 3.5%    Values used to calculate the score:      Age: 49 years      Sex: Male      Is Non- : No      Diabetic: No      Tobacco smoker: No      Systolic Blood Pressure: 136 mmHg      Is BP treated: Yes      HDL Cholesterol: 40 mg/dL      Total " Cholesterol: 163 mg/dL       Morbid obesity (H)  Body mass index is 38.25 kg/m .  Wt Readings from Last 4 Encounters:   06/26/25 (!) 138.8 kg (306 lb)   03/24/25 135.4 kg (298 lb 9.6 oz)   08/19/24 120.2 kg (265 lb)   06/25/24 115.4 kg (254 lb 6 oz)     Primary concern is weight gain. 250s until winter, fell off diet/exercise. Starting to exercise more consistently again but feeling stuck.       Benign essential hypertension  BP Readings from Last 6 Encounters:   06/26/25 136/87   03/24/25 134/89   07/24/24 107/76   06/25/24 121/83   06/29/23 117/78   06/22/23 119/69     Last Comprehensive Metabolic Panel:  Lab Results   Component Value Date     06/25/2024    POTASSIUM 4.3 06/25/2024    CHLORIDE 105 06/25/2024    CO2 24 06/25/2024    ANIONGAP 11 06/25/2024    GLC 91 06/25/2024    BUN 10.0 06/25/2024    CR 0.88 06/25/2024    GFRESTIMATED >90 06/25/2024    KANA 9.3 06/25/2024     Losartan 50mg daily.   Readings consistent at home, average 130/80.       Advance Care Planning    Discussed advance care planning with patient; informed AVS has link to [employer].    Will bring home version in.        6/26/2025   General Health   How would you rate your overall physical health? Good   Feel stress (tense, anxious, or unable to sleep) Only a little   (!) STRESS CONCERN      6/26/2025   Nutrition   Three or more servings of calcium each day? Yes   Diet: Regular (no restrictions)   How many servings of fruit and vegetables per day? (!) 2-3   How many sweetened beverages each day? 0-1         6/26/2025   Exercise   Days per week of moderate/strenous exercise 4 days         6/26/2025   Social Factors   Frequency of gathering with friends or relatives More than three times a week   Worry food won't last until get money to buy more No   Food not last or not have enough money for food? No   Do you have housing? (Housing is defined as stable permanent housing and does not include staying outside in a car, in a tent, in an  abandoned building, in an overnight shelter, or couch-surfing.) Yes   Are you worried about losing your housing? No   Lack of transportation? No   Unable to get utilities (heat,electricity)? No         2025   Dental   Dentist two times every year? Yes         Today's PHQ-2 Score:       2025    10:17 AM   PHQ-2 (  [employer])   Q1: Little interest or pleasure in doing things 0   Q2: Feeling down, depressed or hopeless 0   PHQ-2 Score 0    Q1: Little interest or pleasure in doing things Not at all   Q2: Feeling down, depressed or hopeless Not at all   PHQ-2 Score 0       Patient-reported           2025   Substance Use   Alcohol more than 3/day or more than 7/wk No   Do you use any other substances recreationally? No     Social History     Tobacco Use    Smoking status: Former     Current packs/day: 0.00     Average packs/day: 0.5 packs/day for 1.7 years (0.8 ttl pk-yrs)     Types: Cigarettes     Start date: 2011     Quit date: 2012     Years since quittin.8    Smokeless tobacco: Never    Tobacco comments:     Stopped over a decade ago. No desire to return.   Vaping Use    Vaping status: Never Used   Substance Use Topics    Alcohol use: Not Currently     Comment: No alcohol since 2022.    Drug use: Never           2025   STI Screening   New sexual partner(s) since last STI/HIV test? No   ASCVD Risk   The 10-year ASCVD risk score (Rambo JONES, et al., 2019) is: 3.5%    Values used to calculate the score:      Age: 49 years      Sex: Male      Is Non- : No      Diabetic: No      Tobacco smoker: No      Systolic Blood Pressure: 136 mmHg      Is BP treated: Yes      HDL Cholesterol: 40 mg/dL      Total Cholesterol: 163 mg/dL        2025   Contraception/Family Planning   Questions about contraception or family planning No        Reviewed and updated as needed this visit by Provider   Tobacco  Allergies  Meds  Problems  Med Hx  Surg Hx  Fam Hx    "             Review of Systems  Constitutional, HEENT, cardiovascular, pulmonary, GI, , musculoskeletal, neuro, skin, endocrine and psych systems are negative, except as otherwise noted.     Objective    Exam  /87   Pulse 65   Temp 98.7  F (37.1  C)   Resp 14   Ht 1.905 m (6' 3\")   Wt (!) 138.8 kg (306 lb)   SpO2 97%   BMI 38.25 kg/m     Estimated body mass index is 38.25 kg/m  as calculated from the following:    Height as of this encounter: 1.905 m (6' 3\").    Weight as of this encounter: 138.8 kg (306 lb).    Physical Exam  GENERAL: alert and no distress  NECK: no adenopathy, no asymmetry, masses, or scars  RESP: lungs clear to auscultation - no rales, rhonchi or wheezes  CV: regular rate and rhythm, normal S1 S2, no S3 or S4, no murmur, click or rub, no peripheral edema  ABDOMEN: soft, nontender, no hepatosplenomegaly, no masses and bowel sounds normal  MS: no gross musculoskeletal defects noted, no edema        Signed Electronically by: Tamara Adams DO    "

## 2025-06-27 ENCOUNTER — RESULTS FOLLOW-UP (OUTPATIENT)
Dept: FAMILY MEDICINE | Facility: CLINIC | Age: 50
End: 2025-06-27